# Patient Record
Sex: MALE | Race: BLACK OR AFRICAN AMERICAN | Employment: OTHER | URBAN - NONMETROPOLITAN AREA
[De-identification: names, ages, dates, MRNs, and addresses within clinical notes are randomized per-mention and may not be internally consistent; named-entity substitution may affect disease eponyms.]

---

## 2017-10-09 ENCOUNTER — HOSPITAL ENCOUNTER (OUTPATIENT)
Dept: INTERVENTIONAL RADIOLOGY/VASCULAR | Age: 22
Discharge: HOME | End: 2017-10-09

## 2017-10-09 DIAGNOSIS — Z13.6 ENCOUNTER FOR SCREENING FOR VASCULAR DISEASE: ICD-10-CM

## 2017-10-09 PROCEDURE — 9900000021 US VASCULAR SCREENING

## 2017-12-07 ENCOUNTER — HOSPITAL ENCOUNTER (INPATIENT)
Age: 22
LOS: 7 days | Discharge: HOME | DRG: 816 | End: 2017-12-14
Attending: INTERNAL MEDICINE | Admitting: INTERNAL MEDICINE
Payer: MEDICARE

## 2017-12-07 ENCOUNTER — APPOINTMENT (OUTPATIENT)
Dept: ULTRASOUND IMAGING | Age: 22
DRG: 816 | End: 2017-12-07
Payer: MEDICARE

## 2017-12-07 ENCOUNTER — APPOINTMENT (OUTPATIENT)
Dept: GENERAL RADIOLOGY | Age: 22
DRG: 816 | End: 2017-12-07
Payer: MEDICARE

## 2017-12-07 ENCOUNTER — APPOINTMENT (OUTPATIENT)
Dept: MRI IMAGING | Age: 22
DRG: 816 | End: 2017-12-07
Payer: MEDICARE

## 2017-12-07 DIAGNOSIS — I16.0 HYPERTENSIVE URGENCY: ICD-10-CM

## 2017-12-07 DIAGNOSIS — D75.1 POLYCYTHEMIA: ICD-10-CM

## 2017-12-07 DIAGNOSIS — R07.9 CHEST PAIN, UNSPECIFIED TYPE: Primary | ICD-10-CM

## 2017-12-07 DIAGNOSIS — R06.02 SOB (SHORTNESS OF BREATH): ICD-10-CM

## 2017-12-07 PROBLEM — I16.9 HYPERTENSIVE CRISIS: Status: ACTIVE | Noted: 2017-12-07

## 2017-12-07 PROBLEM — Q67.0 FACIAL ASYMMETRY: Status: ACTIVE | Noted: 2017-12-07

## 2017-12-07 LAB
ALBUMIN SERPL-MCNC: 5 G/DL (ref 3.5–5.1)
ALLEN TEST: POSITIVE
ALP BLD-CCNC: 62 U/L (ref 38–126)
ALT SERPL-CCNC: 37 U/L (ref 11–66)
ANION GAP SERPL CALCULATED.3IONS-SCNC: 16 MEQ/L (ref 8–16)
AST SERPL-CCNC: 30 U/L (ref 5–40)
BACTERIA: ABNORMAL
BASE EXCESS (CALCULATED): 1.4 MMOL/L (ref -2.5–2.5)
BASOPHILS # BLD: 0.4 %
BASOPHILS ABSOLUTE: 0 THOU/MM3 (ref 0–0.1)
BILIRUB SERPL-MCNC: 0.7 MG/DL (ref 0.3–1.2)
BILIRUBIN DIRECT: < 0.2 MG/DL (ref 0–0.3)
BILIRUBIN URINE: NEGATIVE
BLOOD, URINE: NEGATIVE
BUN BLDV-MCNC: 19 MG/DL (ref 7–22)
CALCIUM SERPL-MCNC: 9.7 MG/DL (ref 8.5–10.5)
CARBON MONOXIDE, BLOOD: 4.5 % CO SAT
CASTS: ABNORMAL /LPF
CASTS: ABNORMAL /LPF
CHARACTER, URINE: CLEAR
CHLORIDE BLD-SCNC: 102 MEQ/L (ref 98–111)
CO2: 25 MEQ/L (ref 23–33)
COLLECTED BY:: NORMAL
COLOR: YELLOW
CREAT SERPL-MCNC: 1 MG/DL (ref 0.4–1.2)
CRYSTALS: ABNORMAL
D-DIMER QUANTITATIVE: < 215 NG/ML FEU (ref 0–500)
DEVICE: NORMAL
EKG ATRIAL RATE: 107 BPM
EKG P AXIS: 48 DEGREES
EKG P-R INTERVAL: 154 MS
EKG Q-T INTERVAL: 324 MS
EKG QRS DURATION: 88 MS
EKG QTC CALCULATION (BAZETT): 432 MS
EKG R AXIS: 86 DEGREES
EKG T AXIS: 34 DEGREES
EKG VENTRICULAR RATE: 107 BPM
EOSINOPHIL # BLD: 4.1 %
EOSINOPHILS ABSOLUTE: 0.4 THOU/MM3 (ref 0–0.4)
EPITHELIAL CELLS, UA: ABNORMAL /HPF
GFR SERPL CREATININE-BSD FRML MDRD: > 90 ML/MIN/1.73M2
GLUCOSE BLD-MCNC: 134 MG/DL (ref 70–108)
GLUCOSE, URINE: NEGATIVE MG/DL
HCO3: 25 MMOL/L (ref 23–28)
HCT VFR BLD CALC: 54.3 % (ref 42–52)
HEMOGLOBIN: 18.7 GM/DL (ref 14–18)
KETONES, URINE: ABNORMAL
LEUKOCYTE ESTERASE, URINE: NEGATIVE
LIPASE: 21.8 U/L (ref 5.6–51.3)
LYMPHOCYTES # BLD: 16.7 %
LYMPHOCYTES ABSOLUTE: 1.5 THOU/MM3 (ref 1–4.8)
MCH RBC QN AUTO: 28.1 PG (ref 27–31)
MCHC RBC AUTO-ENTMCNC: 34.3 GM/DL (ref 33–37)
MCV RBC AUTO: 81.9 FL (ref 80–94)
MISCELLANEOUS LAB TEST RESULT: ABNORMAL
MONOCYTES # BLD: 4.5 %
MONOCYTES ABSOLUTE: 0.4 THOU/MM3 (ref 0.4–1.3)
NITRITE, URINE: NEGATIVE
NUCLEATED RED BLOOD CELLS: 0 /100 WBC
O2 SATURATION: 95 %
OSMOLALITY CALCULATION: 289.2 MOSMOL/KG (ref 275–300)
PCO2: 37 MMHG (ref 35–45)
PDW BLD-RTO: 13.5 % (ref 11.5–14.5)
PH BLOOD GAS: 7.44 (ref 7.35–7.45)
PH UA: 7
PLATELET # BLD: 248 THOU/MM3 (ref 130–400)
PMV BLD AUTO: 8.6 MCM (ref 7.4–10.4)
PO2: 74 MMHG (ref 71–104)
POTASSIUM SERPL-SCNC: 3.7 MEQ/L (ref 3.5–5.2)
PROTEIN UA: 30 MG/DL
RBC # BLD: 6.63 MILL/MM3 (ref 4.7–6.1)
RBC URINE: ABNORMAL /HPF
RENAL EPITHELIAL, UA: ABNORMAL
SEG NEUTROPHILS: 74.3 %
SEGMENTED NEUTROPHILS ABSOLUTE COUNT: 6.5 THOU/MM3 (ref 1.8–7.7)
SODIUM BLD-SCNC: 143 MEQ/L (ref 135–145)
SOURCE, BLOOD GAS: NORMAL
SPECIFIC GRAVITY UA: > 1.03 (ref 1–1.03)
TOTAL PROTEIN: 7.8 G/DL (ref 6.1–8)
TROPONIN T: < 0.01 NG/ML
TSH SERPL DL<=0.05 MIU/L-ACNC: 2.54 UIU/ML (ref 0.4–4.2)
UROBILINOGEN, URINE: 1 EU/DL
WBC # BLD: 8.8 THOU/MM3 (ref 4.8–10.8)
WBC UA: ABNORMAL /HPF
YEAST: ABNORMAL

## 2017-12-07 PROCEDURE — 2500000003 HC RX 250 WO HCPCS: Performed by: NURSE PRACTITIONER

## 2017-12-07 PROCEDURE — 1200000003 HC TELEMETRY R&B

## 2017-12-07 PROCEDURE — 82248 BILIRUBIN DIRECT: CPT

## 2017-12-07 PROCEDURE — 80053 COMPREHEN METABOLIC PANEL: CPT

## 2017-12-07 PROCEDURE — 36415 COLL VENOUS BLD VENIPUNCTURE: CPT

## 2017-12-07 PROCEDURE — 6370000000 HC RX 637 (ALT 250 FOR IP): Performed by: INTERNAL MEDICINE

## 2017-12-07 PROCEDURE — 85025 COMPLETE CBC W/AUTO DIFF WBC: CPT

## 2017-12-07 PROCEDURE — 81001 URINALYSIS AUTO W/SCOPE: CPT

## 2017-12-07 PROCEDURE — 70553 MRI BRAIN STEM W/O & W/DYE: CPT

## 2017-12-07 PROCEDURE — 93005 ELECTROCARDIOGRAM TRACING: CPT

## 2017-12-07 PROCEDURE — 36600 WITHDRAWAL OF ARTERIAL BLOOD: CPT

## 2017-12-07 PROCEDURE — 82803 BLOOD GASES ANY COMBINATION: CPT

## 2017-12-07 PROCEDURE — 99285 EMERGENCY DEPT VISIT HI MDM: CPT

## 2017-12-07 PROCEDURE — 84484 ASSAY OF TROPONIN QUANT: CPT

## 2017-12-07 PROCEDURE — A9579 GAD-BASE MR CONTRAST NOS,1ML: HCPCS | Performed by: INTERNAL MEDICINE

## 2017-12-07 PROCEDURE — 84443 ASSAY THYROID STIM HORMONE: CPT

## 2017-12-07 PROCEDURE — 71020 XR CHEST STANDARD TWO VW: CPT

## 2017-12-07 PROCEDURE — 2500000003 HC RX 250 WO HCPCS: Performed by: INTERNAL MEDICINE

## 2017-12-07 PROCEDURE — 96374 THER/PROPH/DIAG INJ IV PUSH: CPT

## 2017-12-07 PROCEDURE — 6360000004 HC RX CONTRAST MEDICATION: Performed by: INTERNAL MEDICINE

## 2017-12-07 PROCEDURE — 6360000002 HC RX W HCPCS: Performed by: INTERNAL MEDICINE

## 2017-12-07 PROCEDURE — 2580000003 HC RX 258: Performed by: INTERNAL MEDICINE

## 2017-12-07 PROCEDURE — 82375 ASSAY CARBOXYHB QUANT: CPT

## 2017-12-07 PROCEDURE — 76770 US EXAM ABDO BACK WALL COMP: CPT

## 2017-12-07 PROCEDURE — 83690 ASSAY OF LIPASE: CPT

## 2017-12-07 PROCEDURE — 6370000000 HC RX 637 (ALT 250 FOR IP): Performed by: NURSE PRACTITIONER

## 2017-12-07 PROCEDURE — 85379 FIBRIN DEGRADATION QUANT: CPT

## 2017-12-07 RX ORDER — PROPRANOLOL HYDROCHLORIDE 20 MG/1
TABLET ORAL 3 TIMES DAILY
Status: ON HOLD | COMMUNITY
End: 2017-12-14 | Stop reason: HOSPADM

## 2017-12-07 RX ORDER — ACETAMINOPHEN 160 MG
1 TABLET,DISINTEGRATING ORAL
COMMUNITY

## 2017-12-07 RX ORDER — ONDANSETRON 2 MG/ML
4 INJECTION INTRAMUSCULAR; INTRAVENOUS EVERY 6 HOURS PRN
Status: DISCONTINUED | OUTPATIENT
Start: 2017-12-07 | End: 2017-12-14 | Stop reason: HOSPADM

## 2017-12-07 RX ORDER — SODIUM CHLORIDE 0.9 % (FLUSH) 0.9 %
10 SYRINGE (ML) INJECTION PRN
Status: DISCONTINUED | OUTPATIENT
Start: 2017-12-07 | End: 2017-12-14 | Stop reason: HOSPADM

## 2017-12-07 RX ORDER — ACETAMINOPHEN 325 MG/1
650 TABLET ORAL EVERY 4 HOURS PRN
Status: DISCONTINUED | OUTPATIENT
Start: 2017-12-07 | End: 2017-12-14 | Stop reason: HOSPADM

## 2017-12-07 RX ORDER — SODIUM CHLORIDE 0.9 % (FLUSH) 0.9 %
10 SYRINGE (ML) INJECTION EVERY 12 HOURS SCHEDULED
Status: DISCONTINUED | OUTPATIENT
Start: 2017-12-07 | End: 2017-12-14 | Stop reason: HOSPADM

## 2017-12-07 RX ORDER — LABETALOL HYDROCHLORIDE 5 MG/ML
10 INJECTION, SOLUTION INTRAVENOUS ONCE
Status: COMPLETED | OUTPATIENT
Start: 2017-12-07 | End: 2017-12-07

## 2017-12-07 RX ORDER — ASPIRIN 81 MG/1
324 TABLET, CHEWABLE ORAL ONCE
Status: COMPLETED | OUTPATIENT
Start: 2017-12-07 | End: 2017-12-07

## 2017-12-07 RX ORDER — LABETALOL HYDROCHLORIDE 5 MG/ML
20 INJECTION, SOLUTION INTRAVENOUS EVERY 4 HOURS PRN
Status: DISCONTINUED | OUTPATIENT
Start: 2017-12-07 | End: 2017-12-14 | Stop reason: HOSPADM

## 2017-12-07 RX ORDER — M-VIT,TX,IRON,MINS/CALC/FOLIC 27MG-0.4MG
1 TABLET ORAL 2 TIMES DAILY
COMMUNITY

## 2017-12-07 RX ADMIN — ACETAMINOPHEN 650 MG: 325 TABLET ORAL at 21:51

## 2017-12-07 RX ADMIN — NITROGLYCERIN 0.5 INCH: 20 OINTMENT TOPICAL at 15:10

## 2017-12-07 RX ADMIN — GADOTERIDOL 20 ML: 279.3 INJECTION, SOLUTION INTRAVENOUS at 18:28

## 2017-12-07 RX ADMIN — ASPIRIN 81 MG 324 MG: 81 TABLET ORAL at 14:52

## 2017-12-07 RX ADMIN — LABETALOL HYDROCHLORIDE 20 MG: 5 INJECTION INTRAVENOUS at 22:04

## 2017-12-07 RX ADMIN — Medication 10 ML: at 21:47

## 2017-12-07 RX ADMIN — ENOXAPARIN SODIUM 40 MG: 40 INJECTION SUBCUTANEOUS at 21:47

## 2017-12-07 RX ADMIN — LABETALOL HYDROCHLORIDE 10 MG: 5 INJECTION INTRAVENOUS at 14:51

## 2017-12-07 ASSESSMENT — ENCOUNTER SYMPTOMS
ABDOMINAL PAIN: 0
SHORTNESS OF BREATH: 1
BLOOD IN STOOL: 0
PHOTOPHOBIA: 0
NAUSEA: 0
COLOR CHANGE: 0
EYE REDNESS: 0
SORE THROAT: 0
DIARRHEA: 0
BACK PAIN: 0
COUGH: 0
VOMITING: 0
SINUS PRESSURE: 0
CHEST TIGHTNESS: 0
VOICE CHANGE: 0
RHINORRHEA: 0
CONSTIPATION: 0
WHEEZING: 0
ABDOMINAL DISTENTION: 0

## 2017-12-07 ASSESSMENT — PAIN DESCRIPTION - PROGRESSION
CLINICAL_PROGRESSION: NOT CHANGED

## 2017-12-07 ASSESSMENT — PAIN SCALES - GENERAL
PAINLEVEL_OUTOF10: 4
PAINLEVEL_OUTOF10: 6
PAINLEVEL_OUTOF10: 6
PAINLEVEL_OUTOF10: 2
PAINLEVEL_OUTOF10: 2
PAINLEVEL_OUTOF10: 6
PAINLEVEL_OUTOF10: 6

## 2017-12-07 ASSESSMENT — PAIN DESCRIPTION - ONSET: ONSET: SUDDEN

## 2017-12-07 ASSESSMENT — PAIN DESCRIPTION - LOCATION: LOCATION: CHEST

## 2017-12-07 ASSESSMENT — PAIN DESCRIPTION - DESCRIPTORS: DESCRIPTORS: PRESSURE;STABBING

## 2017-12-07 ASSESSMENT — PAIN DESCRIPTION - ORIENTATION: ORIENTATION: LEFT

## 2017-12-07 ASSESSMENT — PAIN DESCRIPTION - PAIN TYPE: TYPE: ACUTE PAIN

## 2017-12-07 ASSESSMENT — PAIN DESCRIPTION - FREQUENCY: FREQUENCY: CONTINUOUS

## 2017-12-07 NOTE — ED PROVIDER NOTES
[facility] Emergency [address] [place] [address]       Chief Complaint   Patient presents with    Chest Pain    Shortness of Breath       Nurses Notes reviewed and I agree except as noted in the HPI. HISTORY OF PRESENT ILLNESS    Melissa Arnold is a 23 y.o. male who presents to the ED for evaluation of shortness of breath and chest pain onset yesterday. The patient states the pain started on his left side and it radiated to his right side yesterday, but stayed on his left side today. He rates his pain as a 6/10 in severity and describes it as a stabbing pain in character. He states that the pain is constant and is made worse when he moves. He reports associated heart palpitations, fever, diaphoresis, dizziness, headache earlier and joint pain in his legs which he takes Vitamin D for. The patient states he has not had this pain before. He has hypertension which he takes medication for. The patient denies a family history of heart disease. The patient does not work or go to school due to him being disabled. 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 Patient denies smoking, drinking or doing drugs. The patient denies taking any medication for his pain. Pain description:  Onset: yesterday   Location: left side of chest   Duration: constant   Character: stabbing  Aggravating factors: worse with movement  Radiation: left side to right side yesterday, but left side today  Severity: 6/10    Experienced previously: No    HPI was provided by the patient. REVIEW OF SYSTEMS     Review of Systems   Constitutional: Positive for diaphoresis and fever. Negative for appetite change, chills, fatigue and unexpected weight change. HENT: Negative for congestion, hearing loss, postnasal drip, rhinorrhea, sinus pressure, sore throat and voice change. Eyes: Negative for photophobia, redness and visual disturbance. Respiratory: Positive for shortness of breath.  Negative for cough, chest tightness and wheezing. Cardiovascular: Positive for chest pain and palpitations. Gastrointestinal: Negative for abdominal distention, abdominal pain, blood in stool, constipation, diarrhea, nausea and vomiting. Endocrine: Negative for cold intolerance, heat intolerance, polydipsia, polyphagia and polyuria. Genitourinary: Negative for decreased urine volume, difficulty urinating, dysuria, flank pain and frequency. Musculoskeletal: Positive for arthralgias (joint pain in legs). Negative for back pain, gait problem, joint swelling, neck pain and neck stiffness. Skin: Negative for color change and rash. Allergic/Immunologic: Negative for immunocompromised state. Neurological: Positive for dizziness and headaches (earlier). Negative for tremors, weakness, light-headedness and numbness. Hematological: Does not bruise/bleed easily. Psychiatric/Behavioral: Negative for behavioral problems, confusion, decreased concentration, hallucinations, self-injury and suicidal ideas. The patient is not nervous/anxious. PAST MEDICAL HISTORY    has a past medical history of Color blind and Hypertension. SURGICAL HISTORY      has no past surgical history on file. CURRENT MEDICATIONS       Previous Medications    CHOLECALCIFEROL (VITAMIN D3) 2000 UNITS CAPS    Take 1 capsule by mouth    PROPRANOLOL (INDERAL) 20 MG TABLET    Take by mouth 3 times daily       ALLERGIES     has No Known Allergies. FAMILY HISTORY     has no family status information on file. family history is not on file. SOCIAL HISTORY      reports that he has never smoked. He has never used smokeless tobacco. He reports that he does not drink alcohol or use drugs. PHYSICAL EXAM     INITIAL VITALS:  height is 6' (1.829 m) and weight is 200 lb (90.7 kg). His oral temperature is 98.4 °F (36.9 °C). His blood pressure is 174/102 (abnormal) and his pulse is 105. His respiration is 16 and oxygen saturation is 98%.     Physical Exam   Constitutional: He is oriented to person, place, and time. He appears well-developed and well-nourished. HENT:   Head: Normocephalic. Right Ear: External ear normal.   Left Ear: External ear normal.   Nose: Nose normal.   Mouth/Throat: Uvula is midline and oropharynx is clear and moist.   Eyes: Conjunctivae are normal. Pupils are equal, round, and reactive to light. Right eye exhibits nystagmus (horizontal). Left eye exhibits nystagmus (horizontal). Poor eye contact   Neck: Normal range of motion. Neck supple. Cardiovascular: Normal rate, regular rhythm, S1 normal, S2 normal, normal heart sounds and intact distal pulses. Pulmonary/Chest: Effort normal and breath sounds normal. No respiratory distress. He exhibits no tenderness. Abdominal: Soft. Normal appearance and bowel sounds are normal. He exhibits no distension. There is no tenderness. Musculoskeletal: Normal range of motion. Neurological: He is alert and oriented to person, place, and time. Skin: Skin is warm and dry. No rash noted. No erythema. No pallor. Psychiatric: His behavior is normal. Judgment and thought content normal.   Nursing note and vitals reviewed. DIFFERENTIAL DIAGNOSIS:   ACS, MI, PE    DIAGNOSTIC RESULTS     EKG: All EKG's are interpreted by the Emergency Department Physician who either signs or Co-signs this chart in the absence of a cardiologist.    EKG read and interpreted by myself with comparison to May, 27 2008 gives impression of normal sinus rhythm with heart rate of 107; interval 154; QRS 88;QTc 432; axis p-48,r-86,t-34. RADIOLOGY: non-plain film images(s) such as CT, Ultrasound and MRI are read by the radiologist.  Plain radiographic images are visualized and preliminarily interpreted by the emergency physician unless otherwise stated below. XR CHEST STANDARD (2 VW)   Final Result   Normal chest            **This report has been created using voice recognition software.   It may contain minor errors which are inherent in voice recognition technology. **      Final report electronically signed by Dr. Eugenio Lackey on 12/7/2017 3:21 PM      MRI BRAIN W WO CONTRAST    (Results Pending)         LABS:   Labs Reviewed   CBC WITH AUTO DIFFERENTIAL - Abnormal; Notable for the following:        Result Value    RBC 6.63 (*)     Hemoglobin 18.7 (*)     Hematocrit 54.3 (*)     All other components within normal limits   BASIC METABOLIC PANEL - Abnormal; Notable for the following:     Glucose 134 (*)     All other components within normal limits   URINALYSIS WITH MICROSCOPIC - Abnormal; Notable for the following:     Ketones, Urine TRACE (*)     Specific Gravity, UA >1.030 (*)     Protein, UA 30 (*)     All other components within normal limits   D-DIMER, QUANTITATIVE   HEPATIC FUNCTION PANEL   LIPASE   TROPONIN   TSH WITHOUT REFLEX   ANION GAP   GLOMERULAR FILTRATION RATE, ESTIMATED   OSMOLALITY   BLOOD GAS, ARTERIAL   CARBOXYHEMOGLOBIN       EMERGENCY DEPARTMENT COURSE:   Vitals:    Vitals:    12/07/17 1512 12/07/17 1555 12/07/17 1625 12/07/17 1740   BP: (!) 172/118 (!) 192/113 (!) 179/90 (!) 174/102   Pulse: 101 106 101 105   Resp: 14 14 16 16   Temp:       TempSrc:       SpO2: 98% 100% 98% 98%   Weight:       Height:             MDM    Patient was seen and evaluated in the emergency department, patient appeared to be in no acute distress. Physical exam reveals no specific findings. He had some horizontal nystagmus, but he has a gaze that apparently is chronically abnormal.  Labs are obtained, no abnormality was noted. He had of well score that made him a low probability of PE, but he was tachycardic therefore he could not be PERC rule cleared, D-dimer was not elevated, so patient low risk for PE. Troponin was negative and chest pains been present since yesterday. He did have some elevated hemoglobin, no previous labs were drawn this patient so unable to trend this. He is a nonsmoker, he does live at [facility].   Apparently the

## 2017-12-07 NOTE — ED NOTES
Patient transported to MRI via wheelchair in stable condition. MRI will contact transport to take patient to [facility] upon completion.       Starlin Collet, RN  12/07/17 1611

## 2017-12-07 NOTE — H&P
no known allergies. Social History:    reports that he has never smoked. He has never used smokeless tobacco. He reports that he does not drink alcohol or use drugs. Family History:   History reviewed. No pertinent family history. Review of systems:    10 point review of systems completed, all other than noted above are negative. Vitals:   Vitals:    12/07/17 1845   BP: 133/87   Pulse: 111   Resp: 14   Temp: 97.8 °F (36.6 °C)   SpO2:       BMI: Body mass index is 27.12 kg/m². Exam:  Physical Examination: General appearance - Well developed young male, met lying in bed, in no apparent distress. Mental status - alert, oriented to person, place, and time, his affect however appears inappropriate; patient does not maintain eye contact  Neck - supple, no significant adenopathy, no JVD, or carotid bruits  Chest - clear to auscultation, no wheezes, rales or rhonchi, symmetric air entry  Heart - Rapid rate but normal rhythm, loud S1  Abdomen - soft, nontender, nondistended, no masses or organomegaly  Neurological - alert, oriented, normal speech, no focal findings or movement disorder noted, abnormal findings: slight facial asymmetry, right brow higher than left with a crooked smile favoring the left, otherwise no focal muscle deficit. Musculoskeletal - no joint tenderness, deformity or swelling  Extremities - peripheral pulses normal, no pedal edema, no clubbing or cyanosis, brisk capillary refill.   Skin - normal coloration and turgor, no rashes, no suspicious skin lesions noted      Review of Labs and Diagnostic Testing:    Recent Results (from the past 24 hour(s))   EKG Chest Pain    Collection Time: 12/07/17  2:23 PM   Result Value Ref Range    Ventricular Rate 107 BPM    Atrial Rate 107 BPM    P-R Interval 154 ms    QRS Duration 88 ms    Q-T Interval 324 ms    QTc Calculation (Bazett) 432 ms    P Axis 48 degrees    R Axis 86 degrees    T Axis 34 degrees   D-dimer, quantitative Collection Time: 12/07/17  2:40 PM   Result Value Ref Range    D-Dimer, Quant < 215.00 0.00 - 500.0 ng/ml FEU   CBC auto differential    Collection Time: 12/07/17  2:40 PM   Result Value Ref Range    WBC 8.8 4.8 - 10.8 thou/mm3    RBC 6.63 (H) 4.70 - 6.10 mill/mm3    Hemoglobin 18.7 (H) 14.0 - 18.0 gm/dl    Hematocrit 54.3 (H) 42.0 - 52.0 %    MCV 81.9 80.0 - 94.0 fL    MCH 28.1 27.0 - 31.0 pg    MCHC 34.3 33.0 - 37.0 gm/dl    RDW 13.5 11.5 - 14.5 %    Platelets 710 837 - 851 thou/mm3    MPV 8.6 7.4 - 10.4 mcm    Seg Neutrophils 74.3 %    Lymphocytes 16.7 %    Monocytes 4.5 %    Eosinophils 4.1 %    Basophils 0.4 %    nRBC 0 /100 wbc    Segs Absolute 6.5 1.8 - 7.7 thou/mm3    Lymphocytes # 1.5 1.0 - 4.8 thou/mm3    Monocytes # 0.4 0.4 - 1.3 thou/mm3    Eosinophils # 0.4 0.0 - 0.4 thou/mm3    Basophils # 0.0 0.0 - 0.1 thou/mm3   Basic Metabolic Panel    Collection Time: 12/07/17  2:40 PM   Result Value Ref Range    Sodium 143 135 - 145 meq/L    Potassium 3.7 3.5 - 5.2 meq/L    Chloride 102 98 - 111 meq/L    CO2 25 23 - 33 meq/L    Glucose 134 (H) 70 - 108 mg/dL    BUN 19 7 - 22 mg/dL    CREATININE 1.0 0.4 - 1.2 mg/dL    Calcium 9.7 8.5 - 10.5 mg/dL   Hepatic function panel    Collection Time: 12/07/17  2:40 PM   Result Value Ref Range    Alb 5.0 3.5 - 5.1 g/dL    Total Bilirubin 0.7 0.3 - 1.2 mg/dL    Bilirubin, Direct <0.2 0.0 - 0.3 mg/dL    Alkaline Phosphatase 62 38 - 126 U/L    AST 30 5 - 40 U/L    ALT 37 11 - 66 U/L    Total Protein 7.8 6.1 - 8.0 g/dL   Lipase    Collection Time: 12/07/17  2:40 PM   Result Value Ref Range    Lipase 21.8 5.6 - 51.3 U/L   Troponin    Collection Time: 12/07/17  2:40 PM   Result Value Ref Range    Troponin T < 0.010 ng/ml   TSH without Reflex    Collection Time: 12/07/17  2:40 PM   Result Value Ref Range    TSH 2.540 0.400 - 4.20 uIU/mL   Anion Gap    Collection Time: 12/07/17  2:40 PM   Result Value Ref Range    Anion Gap 16.0 8.0 - 16.0 meq/L   Glomerular Filtration Rate,

## 2017-12-08 ENCOUNTER — APPOINTMENT (OUTPATIENT)
Dept: ULTRASOUND IMAGING | Age: 22
DRG: 816 | End: 2017-12-08
Payer: MEDICARE

## 2017-12-08 PROBLEM — I16.0 HYPERTENSIVE URGENCY: Status: ACTIVE | Noted: 2017-12-07

## 2017-12-08 LAB
ANION GAP SERPL CALCULATED.3IONS-SCNC: 19 MEQ/L (ref 8–16)
BASOPHILS # BLD: 0.4 %
BASOPHILS ABSOLUTE: 0 THOU/MM3 (ref 0–0.1)
BUN BLDV-MCNC: 18 MG/DL (ref 7–22)
CALCIUM SERPL-MCNC: 9.4 MG/DL (ref 8.5–10.5)
CHLORIDE BLD-SCNC: 100 MEQ/L (ref 98–111)
CO2: 24 MEQ/L (ref 23–33)
CREAT SERPL-MCNC: 0.8 MG/DL (ref 0.4–1.2)
EOSINOPHIL # BLD: 3.7 %
EOSINOPHILS ABSOLUTE: 0.3 THOU/MM3 (ref 0–0.4)
GFR SERPL CREATININE-BSD FRML MDRD: > 90 ML/MIN/1.73M2
GLUCOSE BLD-MCNC: 114 MG/DL (ref 70–108)
HCT VFR BLD CALC: 52.3 % (ref 42–52)
HEMOGLOBIN: 18 GM/DL (ref 14–18)
LV EF: 65 %
LVEF MODALITY: NORMAL
LYMPHOCYTES # BLD: 22.4 %
LYMPHOCYTES ABSOLUTE: 2.1 THOU/MM3 (ref 1–4.8)
MCH RBC QN AUTO: 28.6 PG (ref 27–31)
MCHC RBC AUTO-ENTMCNC: 34.4 GM/DL (ref 33–37)
MCV RBC AUTO: 83.1 FL (ref 80–94)
MONOCYTES # BLD: 7 %
MONOCYTES ABSOLUTE: 0.7 THOU/MM3 (ref 0.4–1.3)
NUCLEATED RED BLOOD CELLS: 0 /100 WBC
PDW BLD-RTO: 13.1 % (ref 11.5–14.5)
PLATELET # BLD: 217 THOU/MM3 (ref 130–400)
PMV BLD AUTO: 8.5 MCM (ref 7.4–10.4)
POTASSIUM SERPL-SCNC: 4 MEQ/L (ref 3.5–5.2)
RBC # BLD: 6.29 MILL/MM3 (ref 4.7–6.1)
RETICULOCYTE ABSOLUTE COUNT: 1.6 % (ref 0.5–2)
SEG NEUTROPHILS: 66.5 %
SEGMENTED NEUTROPHILS ABSOLUTE COUNT: 6.3 THOU/MM3 (ref 1.8–7.7)
SODIUM BLD-SCNC: 143 MEQ/L (ref 135–145)
WBC # BLD: 9.4 THOU/MM3 (ref 4.8–10.8)

## 2017-12-08 PROCEDURE — 99221 1ST HOSP IP/OBS SF/LOW 40: CPT | Performed by: INTERNAL MEDICINE

## 2017-12-08 PROCEDURE — 6370000000 HC RX 637 (ALT 250 FOR IP): Performed by: INTERNAL MEDICINE

## 2017-12-08 PROCEDURE — 36415 COLL VENOUS BLD VENIPUNCTURE: CPT

## 2017-12-08 PROCEDURE — 93975 VASCULAR STUDY: CPT

## 2017-12-08 PROCEDURE — 6360000002 HC RX W HCPCS: Performed by: INTERNAL MEDICINE

## 2017-12-08 PROCEDURE — 2580000003 HC RX 258: Performed by: INTERNAL MEDICINE

## 2017-12-08 PROCEDURE — 1200000003 HC TELEMETRY R&B

## 2017-12-08 PROCEDURE — 80048 BASIC METABOLIC PNL TOTAL CA: CPT

## 2017-12-08 PROCEDURE — 93306 TTE W/DOPPLER COMPLETE: CPT

## 2017-12-08 PROCEDURE — 85045 AUTOMATED RETICULOCYTE COUNT: CPT

## 2017-12-08 PROCEDURE — 85025 COMPLETE CBC W/AUTO DIFF WBC: CPT

## 2017-12-08 RX ORDER — PROPRANOLOL HYDROCHLORIDE 20 MG/1
20 TABLET ORAL 3 TIMES DAILY
Status: DISCONTINUED | OUTPATIENT
Start: 2017-12-08 | End: 2017-12-11

## 2017-12-08 RX ADMIN — Medication 10 ML: at 10:05

## 2017-12-08 RX ADMIN — Medication 10 ML: at 20:58

## 2017-12-08 RX ADMIN — ENOXAPARIN SODIUM 40 MG: 40 INJECTION SUBCUTANEOUS at 10:05

## 2017-12-08 RX ADMIN — PROPRANOLOL HYDROCHLORIDE 20 MG: 20 TABLET ORAL at 10:05

## 2017-12-08 RX ADMIN — LABETALOL HYDROCHLORIDE 20 MG: 5 INJECTION INTRAVENOUS at 12:22

## 2017-12-08 RX ADMIN — PROPRANOLOL HYDROCHLORIDE 20 MG: 20 TABLET ORAL at 15:56

## 2017-12-08 RX ADMIN — PROPRANOLOL HYDROCHLORIDE 20 MG: 20 TABLET ORAL at 20:58

## 2017-12-08 ASSESSMENT — PAIN SCALES - GENERAL: PAINLEVEL_OUTOF10: 0

## 2017-12-08 NOTE — CONSULTS
[facility]    [facility]  [address]  [facility], [address]  [address] [place] [address] [facility]      12/8/2017 7:05 AM         Pt Name:    Chel Ivy  MRN:     [id number]   [id number]  YOB: 1995  Admit Date:    12/7/2017  2:19 PM  Primary Care Physician:  Constance Mitchell MD   Room Number:  3V-11/513-S   Reason for Consult:  Uncontrolled blood pressure  Requesting Providor: Dr. Liss Dale  Primary Nephrologist: none    ### ISOLATION ###:   none     Admit Chief Complaint: left chest pain     History of Chief Complaint:   The patient is a 23y.o. year old white male who has never seen a nephrologist before. He mentioned that he ha shad hypertension since age 4 in 2004. He has been taking pills since then. He developed left chest pain that was non radiating 12/07/17. He has not undergone workup for his hypertension that he can remember. He has no trouble at bladder emptying. He does not use illegal drugs and does not check his blood pressure at home. Nephrology is following the patient for: uncontrolled hypertension     24 hour summary:   The patient was relaxing in bed. He feels improved and states that his left chest pain persists but is improved. Baseline eGFR > 90 ml/min   Admit eGFR 90 ml/min   Current eGFR 90 ml/min     Compliance with treatment plan: 100%     Allergies and Intolerances: ALLERGIES: Review of patient's allergies indicates no known allergies. MEDICATION INTOLERANCES: none  FOOD ALLERGIES: none  INSECT ALLERGIES: none  PLANT ALLERGIES: none     Past Medical History:  Past Medical History:   Diagnosis Date    Color blind     Cannot see red.  Hypertension         Past Surgical History:  History reviewed. No pertinent surgical history. Family History:  Family History   Problem Relation Age of Onset    Adopted:  Yes    High Blood Pressure Brother         Social History:  Social History     Social History    Marital status: Single none.    EYES  Blurred vision: none, Double vision: none, Photophobia: none, Eye pain: none, Eye discharge: none,  Eye redness: none. CARDIOVASCULAR  Chest pain: yes, Arm pain: none, Palpitations: none, Orthopnea: none, Claudication: none,   Leg swelling: none, PND: none. RESPIRATORY  Cough: none, Hemoptysis: none, Sputum production: none, Shortness of breath: none, Wheezing: none    GASTROINTESTINAL  Heartburn: none, Nausea: none, Vomiting: none, Abdominal pain: none, Diarrhea: none,   Constipation: none, Blood in the stool: none, Melena: none, Rebound: none, Rovsing's: none. GENITOURINARY  Dysuria: none, Pyuria: none, Gross hematuria: none, Urgency: none, Flank pain: none, STD: none. MUSCULOSKELETAL  Myalgia: none, Neck pain: none, Thoracic pain: none, Lumbar pain: none, Joint pain: none, Falls: none    ENDOCRINE/HEMATOLOGY/ALLERGIC  Easy bruising: none, Easy bleeding: none, Environmental allergies: none, Polydipsia: none. NEUROLOGICAL  Dizziness: none, Tingling: none, Numbness: none, Tremor: none, Sensory change: none,   Speech change: none, Focal weakness: none, Seizures: none, Loss of consciousness: none,    PSYCHIATRIC  Depression: none, Suicidal ideation: none, Heroin abuse: none, Cocaine abuse: none,   Marijuana abuse: none, Hallucinations: none, Anxiety: none, Memory loss: none       Physical Examination:  BP (!) 145/86   Pulse 76   Temp 97.4 °F (36.3 °C) (Oral)   Resp 16   Ht 6' (1.829 m)   Wt 242 lb 11.2 oz (110.1 kg)   SpO2 97%   BMI 32.92 kg/m²       General appearance: alert and cooperative with exam  HEENT: Head: Normocephalic, no lesions, without obvious abnormality.   Neck: no adenopathy, no carotid bruit, no JVD, supple, symmetrical, trachea midline and thyroid not enlarged, symmetric, no tenderness/mass/nodules  Lungs: clear to auscultation bilaterally  Heart: regular rate and rhythm, S1, S2 normal, no murmur, click, rub or gallop  Abdomen: soft, non-tender; bowel sounds

## 2017-12-08 NOTE — FLOWSHEET NOTE
12/07/17 2102   Provider Notification   Reason for Communication Evaluate   Provider Name Dr. Reji Garza   Provider Notification Physician   Method of Communication Call   Response See orders   Notification Time 2102     Dr. Reji Garza consulted regarding evaluation for accelerated hypertension, will add to list. New order for Renal US.

## 2017-12-08 NOTE — PLAN OF CARE
Problem: Pain:  Goal: Pain level will decrease  Pain level will decrease    Outcome: Ongoing  Patient denies pain this shift. Problem: Discharge Planning:  Goal: Participates in care planning  Participates in care planning   Outcome: Ongoing  Patient plans to be discharged home once medically stable     Problem: Cardiac Output - Decreased:  Goal: Hemodynamic stability will improve  Hemodynamic stability will improve   Outcome: Ongoing  Vitals:    12/08/17 0945   BP: (!) 158/91   Pulse: 91   Resp: 18   Temp: 98 °F (36.7 °C)   SpO2: 93%       Problem: Pain:  Goal: Pain level will decrease  Pain level will decrease    Outcome: Ongoing  Patient denies pain this shift. Comments: Care plan reviewed with patient. Patient verbalize understanding of the plan of care and contribute to goal setting.

## 2017-12-08 NOTE — CARE COORDINATION
12/8/17, 9:29 AM      [facility]       Admitted from: ED 12/7/2017/ Voldi 24 day: 1   Location: [facility]-A Reason for admit: Hypertensive crisis [I16.9] Status: IP  Admit order signed?: yes  PMH:  has a past medical history of Color blind and Hypertension. Procedure: Echo today, 12/8  Pertinent abnormal Imaging:none  Medications:none  Scheduled Meds:   propranolol  20 mg Oral TID    sodium chloride flush  10 mL Intravenous 2 times per day    enoxaparin  40 mg Subcutaneous Daily     Continuous Infusions:    Pertinent Info/Orders/Treatment Plan:  Telemetry, nephrology consult  Diet: DIET LOW SODIUM 2 GM;   DVT Prophylaxis: Lovenox  Smoking status:  reports that he has never smoked. He has never used smokeless tobacco.   Influenza Vaccination Screening Completed: yes  Pneumonia Vaccination Screening Completed: yes  Core measures: monitor, vte  PCP: Bruce Gray MD  Readmission:   no  Risk Score: 2.5     Discharge Planning  Current Residence:  Private Residence  Living Arrangements:  Alone   Support Systems:  Family Members, Friends/Neighbors, [employer]  Current Services PTA:     Potential Assistance Needed:  N/A  Potential Assistance Purchasing Medications:  No  Does patient want to participate in local refill/ meds to beds program?  No  Type of Home Care Services:  None  Patient expects to be discharged to:  Home  Expected Discharge date:  12/09/17  Follow Up Appointment: Best Day/ Time: Monday AM    Discharge Plan: Met with client, lives at home alone with good family support, no equipment used,no services received. Plans to return home,denies needs at discharge.      Evaluation: no

## 2017-12-09 LAB
BASOPHILS # BLD: 0.6 %
BASOPHILS ABSOLUTE: 0.1 THOU/MM3 (ref 0–0.1)
EOSINOPHIL # BLD: 3.2 %
EOSINOPHILS ABSOLUTE: 0.3 THOU/MM3 (ref 0–0.4)
HCT VFR BLD CALC: 54.7 % (ref 42–52)
HEMOGLOBIN: 19.1 GM/DL (ref 14–18)
LYMPHOCYTES # BLD: 21.5 %
LYMPHOCYTES ABSOLUTE: 2.2 THOU/MM3 (ref 1–4.8)
MCH RBC QN AUTO: 29.1 PG (ref 27–31)
MCHC RBC AUTO-ENTMCNC: 34.9 GM/DL (ref 33–37)
MCV RBC AUTO: 83.3 FL (ref 80–94)
MONOCYTES # BLD: 8 %
MONOCYTES ABSOLUTE: 0.8 THOU/MM3 (ref 0.4–1.3)
NUCLEATED RED BLOOD CELLS: 0 /100 WBC
PDW BLD-RTO: 12.9 % (ref 11.5–14.5)
PLATELET # BLD: 218 THOU/MM3 (ref 130–400)
PMV BLD AUTO: 8.5 MCM (ref 7.4–10.4)
RBC # BLD: 6.57 MILL/MM3 (ref 4.7–6.1)
SEG NEUTROPHILS: 66.7 %
SEGMENTED NEUTROPHILS ABSOLUTE COUNT: 6.9 THOU/MM3 (ref 1.8–7.7)
WBC # BLD: 10.4 THOU/MM3 (ref 4.8–10.8)

## 2017-12-09 PROCEDURE — 99232 SBSQ HOSP IP/OBS MODERATE 35: CPT | Performed by: INTERNAL MEDICINE

## 2017-12-09 PROCEDURE — 82088 ASSAY OF ALDOSTERONE: CPT

## 2017-12-09 PROCEDURE — 6370000000 HC RX 637 (ALT 250 FOR IP): Performed by: INTERNAL MEDICINE

## 2017-12-09 PROCEDURE — 6370000000 HC RX 637 (ALT 250 FOR IP): Performed by: NURSE PRACTITIONER

## 2017-12-09 PROCEDURE — 36415 COLL VENOUS BLD VENIPUNCTURE: CPT

## 2017-12-09 PROCEDURE — 84244 ASSAY OF RENIN: CPT

## 2017-12-09 PROCEDURE — 6360000002 HC RX W HCPCS: Performed by: INTERNAL MEDICINE

## 2017-12-09 PROCEDURE — 99999 PR OFFICE/OUTPT VISIT,PROCEDURE ONLY: CPT | Performed by: NURSE PRACTITIONER

## 2017-12-09 PROCEDURE — 1200000003 HC TELEMETRY R&B

## 2017-12-09 PROCEDURE — 85025 COMPLETE CBC W/AUTO DIFF WBC: CPT

## 2017-12-09 PROCEDURE — 2580000003 HC RX 258: Performed by: INTERNAL MEDICINE

## 2017-12-09 RX ORDER — PROPRANOLOL HYDROCHLORIDE 40 MG/1
40 TABLET ORAL 3 TIMES DAILY
Status: CANCELLED | OUTPATIENT
Start: 2017-12-09

## 2017-12-09 RX ORDER — NIFEDIPINE 30 MG/1
30 TABLET, EXTENDED RELEASE ORAL DAILY
Status: DISCONTINUED | OUTPATIENT
Start: 2017-12-09 | End: 2017-12-09

## 2017-12-09 RX ORDER — NIFEDIPINE 30 MG/1
30 TABLET, EXTENDED RELEASE ORAL NIGHTLY
Status: DISCONTINUED | OUTPATIENT
Start: 2017-12-09 | End: 2017-12-10

## 2017-12-09 RX ADMIN — PROPRANOLOL HYDROCHLORIDE 20 MG: 20 TABLET ORAL at 21:44

## 2017-12-09 RX ADMIN — PROPRANOLOL HYDROCHLORIDE 20 MG: 20 TABLET ORAL at 09:43

## 2017-12-09 RX ADMIN — PROPRANOLOL HYDROCHLORIDE 20 MG: 20 TABLET ORAL at 14:20

## 2017-12-09 RX ADMIN — NIFEDIPINE 30 MG: 30 TABLET, FILM COATED, EXTENDED RELEASE ORAL at 21:44

## 2017-12-09 RX ADMIN — ENOXAPARIN SODIUM 40 MG: 40 INJECTION SUBCUTANEOUS at 09:44

## 2017-12-09 RX ADMIN — LABETALOL HYDROCHLORIDE 20 MG: 5 INJECTION INTRAVENOUS at 12:03

## 2017-12-09 RX ADMIN — Medication 10 ML: at 21:46

## 2017-12-09 ASSESSMENT — PAIN DESCRIPTION - LOCATION: LOCATION: CHEST

## 2017-12-09 ASSESSMENT — PAIN DESCRIPTION - PAIN TYPE: TYPE: ACUTE PAIN

## 2017-12-09 ASSESSMENT — PAIN SCALES - GENERAL: PAINLEVEL_OUTOF10: 2

## 2017-12-09 ASSESSMENT — PAIN DESCRIPTION - DESCRIPTORS: DESCRIPTORS: DISCOMFORT

## 2017-12-09 ASSESSMENT — PAIN DESCRIPTION - ORIENTATION: ORIENTATION: LEFT

## 2017-12-09 NOTE — PLAN OF CARE
Problem: Pain:  Goal: Pain level will decrease  Pain level will decrease    Outcome: Met This Shift  Patient pain goal: no pain. Patient denies pain this shift. Problem: Discharge Planning:  Goal: Participates in care planning  Participates in care planning   Outcome: Met This Shift  Patient participated in plan of care and contributed to goal setting. Goal: Discharged to appropriate level of care  Discharged to appropriate level of care   Outcome: Ongoing  Patient from home, plans to return home at unknown date. Problem: Pain:  Goal: Pain level will decrease  Pain level will decrease    Outcome: Met This Shift  Patient pain goal: no pain. Patient denies pain this shift. Problem: Tissue Perfusion - Cardiopulmonary, Altered:  Goal: Absence of angina  Absence of angina   Outcome: Met This Shift  Patient states he had a few episodes CP during day shift, denies any CP so far this shift.

## 2017-12-09 NOTE — FLOWSHEET NOTE
12/09/17 1009   Provider Notification   Reason for Communication Review case   Provider Name Dr Jacey Clark    Provider Notification Physician   Method of Communication Call   Response See orders   Notification Time 1010   Dr Jacey Clark will see patient for consult of polycythemia    Telephone Orders -   Removed 500cc of blood from patient (therapeutic phlebotomy)  CBC lab 12/10  ANGEL-2 lab 12/10  Erythropoietin lab 12/10    1030 - Call placed to outpatient Oncology and message left for therapeutic phlebotomy to be done. Waiting for response.

## 2017-12-09 NOTE — PROGRESS NOTES
and echogenicity of the kidneys bilaterally.                     ASSESMENT:      Active Problems:    Hypertensive urgency    Polycythemia      Facial asymmetry    Chest pain         PLAN:  Renal artery doppler did not show  LOUANN , polycythemia may be playing a role in his HTN , plan hematology consult.          DVT prophylaxis: [x] Lovenox                                 [] SCDs                                 [] SQ Heparin                                 [] Encourage ambulation, low risk for DVT, no chemical or mechanical prophylaxis necessary              [] Already on Anticoagulation                Anticipated Disposition upon discharge: [x] Home                                                                         [] Home with Home Health                                                                         [] [facility]                                                                         [] [address]          Electronically signed by Dipika Jordan MD on 12/9/2017 at 9:57 AM

## 2017-12-09 NOTE — PROGRESS NOTES
[facility]         Renal Inpatient Follow-Up note         12/9/2017 3:08 PM    Pt Name:   Megan Jade  YOB: 1995  Attending:   Carmine Armstrong MD    Chief Complaint : Megan Jade is a 23 y.o. male being followed by nephrology for uncontrolled HTN    Interval History :   Patient seen and examined by me. No distress  Pt states he has generalized body aching 5/10 and chest pressure. Has some anxiety component. Pt has very astute hearing due to visual limitation, states he has also been having  Increased pain in his anterior shins for the past year or so. Hematology seeing for polycythemia       Scheduled Medications :    propranolol  20 mg Oral TID    sodium chloride flush  10 mL Intravenous 2 times per day    enoxaparin  40 mg Subcutaneous Daily          Vitals :  BP (!) 159/98   Pulse 75   Temp 98.8 °F (37.1 °C) (Oral)   Resp 18   Ht 6' (1.829 m)   Wt 240 lb 14.4 oz (109.3 kg)   SpO2 93%   BMI 32.67 kg/m²     24HR INTAKE/OUTPUT:      Intake/Output Summary (Last 24 hours) at 12/09/17 1508  Last data filed at 12/08/17 2049   Gross per 24 hour   Intake              510 ml   Output                0 ml   Net              510 ml       Last 3 Weights  Wt Readings from Last 3 Encounters:   12/09/17 240 lb 14.4 oz (109.3 kg)           Physical Exam :  Constitutional: alert and cooperative with exam, appears comfortable, no distress  Oral: moist oral mucus membranes  Neck: No JVD  Lungs: Clear  Heart: regular rate and rhythm, S1, S2   Extremities: No LE edema, dressing to june feet. GI: soft, non-tender, no guarding  Skin: multiple scabs/wounds to scalp, finger tips.    Musculo: moves all extremities  Neuro: no deficits apparent  Psychiatric: Awake, alert, Oriented         Last 3 CBC   Recent Labs      12/07/17   1440  12/08/17   0527  12/09/17   0459   WBC  8.8  9.4  10.4   RBC  6.63*  6.29*  6.57*   HGB  18.7*  18.0  19.1*   HCT  54.3*  52.3*  54.7*   PLT  248 217  218     Last 3 CMP  Recent Labs      12/07/17   1440  12/08/17   0527   NA  143  143   K  3.7  4.0   CL  102  100   CO2  25  24   BUN  19  18   CREATININE  1.0  0.8   CALCIUM  9.7  9.4   LABALBU  5.0   --    BILITOT  0.7   --          US duplex abd pelvic retro 12/8/17  Narrative   PROCEDURE: US DUP ABD PEL RETRO SCROT COMP       CLINICAL INFORMATION: HYPERTENSION, uncontrolled blood pressure        COMPARISON: No prior study.       TECHNIQUE:  Grayscale, color flow and spectral waveform ultrasound images were obtained through the bilateral kidneys and bilateral renal arteries. Resistive indices and renal aortic ratios were measured.         FINDINGS: The kidneys appear normal in size and echogenicity. No renal masses are identified. No hydronephrosis is seen. The resistive indices appear within normal limits within the kidneys bilaterally.        The renal aortic ratios within the right kidney measure 2.6, 0.7, and 1.1 within the proximal, mid and distal right renal arteries respectively.       The renal aortic ratios within the proximal, mid and distal renal arteries measures 0.6, 1.1, and 1.3 respectively. These are within normal range.       The peak systolic velocities within the proximal, mid and distal right renal arteries measure 46 cm/s, 49 cm/s, and 78 cm/s respectively.       The proximal, mid and distal peak systolic velocities within the left renal artery measure 45 cm/s, 84 cm/s, and 95 cm/s respectively.       The peak systolic velocity within the aorta at the level of the renal arteries measures approximately 73 cm/s.       RIGHT KIDNEY - 11.6 x 6.5 x 5.0 cm   Resistive Index - superior: 0.55, mid: 0.49, inferior: 0.53   Cortical Thickness - 1.0 cm       LEFT KIDNEY - 12.2 x 5.2 x 5.4 cm   Resistive Index - superior: 0.49, mid: 0.50, inferior: 0.59    Cortical Thickness - 1.2 cm           Impression   1. No sonographic evidence of significant renal artery stenosis.    2. Normal resistive indices bilaterally. 3. Normal size and echogenicity of the kidneys bilaterally. Assessment    1. Uncontrolled hypertension, variable, /109 this am. 159/98 this afternoon after am medication.   - On Propanolol 20 mg TID, and Labetalol is being given 20 mg q 4 hours PRN. - Will add Nifedipine 30 mg daily at bedtime, start today. - Will monitor response. 2. Chest pressure thought to be due to uncontrolled hypertension, maybe component of increased RBC mass of polycythemia. 3. Polycythemia, suspected, hematology workup to include JAK2 erythropoetin levels and therapeutic phlebotomy of 500 mL on Monday. 4. Legally blind. Has issues with neighbors four dogs barking all day, and highly astute hearing, stress component to HTN and pain? Discussed with Dr. Sara Sutherland. Gila Hay, CNP  [facility].

## 2017-12-09 NOTE — PLAN OF CARE
Problem: Pain:  Goal: Control of acute pain  Control of acute pain   Outcome: Ongoing  Patient complaining of generalized muscular aches. Problem: Discharge Planning:  Goal: Participates in care planning  Participates in care planning   Outcome: Ongoing  Patient plans to be discharged home once medically stable. Therapeutic Phlebotomy to be completed on Monday 12/11 per oncology. Problem: Cardiac Output - Decreased:  Goal: Hemodynamic stability will improve  Hemodynamic stability will improve   Outcome: Ongoing  Vitals:    12/09/17 1537   BP: (!) 142/95   Pulse: 65   Resp: 17   Temp: 99 °F (37.2 °C)   SpO2: 95%   Procardia started nightly per nephrology      Comments: Care plan reviewed with patient. Patient verbalize understanding of the plan of care and contribute to goal setting.

## 2017-12-10 LAB
BASOPHILS # BLD: 0.1 %
BASOPHILS ABSOLUTE: 0 THOU/MM3 (ref 0–0.1)
EOSINOPHIL # BLD: 2.2 %
EOSINOPHILS ABSOLUTE: 0.2 THOU/MM3 (ref 0–0.4)
HCT VFR BLD CALC: 52.4 % (ref 42–52)
HEMOGLOBIN: 17.8 GM/DL (ref 14–18)
LYMPHOCYTES # BLD: 13.8 %
LYMPHOCYTES ABSOLUTE: 1.3 THOU/MM3 (ref 1–4.8)
MCH RBC QN AUTO: 28.2 PG (ref 27–31)
MCHC RBC AUTO-ENTMCNC: 33.9 GM/DL (ref 33–37)
MCV RBC AUTO: 83.1 FL (ref 80–94)
MONOCYTES # BLD: 5.9 %
MONOCYTES ABSOLUTE: 0.6 THOU/MM3 (ref 0.4–1.3)
NUCLEATED RED BLOOD CELLS: 0 /100 WBC
PDW BLD-RTO: 13.3 % (ref 11.5–14.5)
PLATELET # BLD: 247 THOU/MM3 (ref 130–400)
PMV BLD AUTO: 8.1 MCM (ref 7.4–10.4)
RBC # BLD: 6.31 MILL/MM3 (ref 4.7–6.1)
SEG NEUTROPHILS: 78 %
SEGMENTED NEUTROPHILS ABSOLUTE COUNT: 7.6 THOU/MM3 (ref 1.8–7.7)
WBC # BLD: 9.7 THOU/MM3 (ref 4.8–10.8)

## 2017-12-10 PROCEDURE — 36415 COLL VENOUS BLD VENIPUNCTURE: CPT

## 2017-12-10 PROCEDURE — 2580000003 HC RX 258: Performed by: INTERNAL MEDICINE

## 2017-12-10 PROCEDURE — 6360000002 HC RX W HCPCS: Performed by: INTERNAL MEDICINE

## 2017-12-10 PROCEDURE — 85025 COMPLETE CBC W/AUTO DIFF WBC: CPT

## 2017-12-10 PROCEDURE — 1200000003 HC TELEMETRY R&B

## 2017-12-10 PROCEDURE — 81270 JAK2 GENE: CPT

## 2017-12-10 PROCEDURE — 99232 SBSQ HOSP IP/OBS MODERATE 35: CPT | Performed by: INTERNAL MEDICINE

## 2017-12-10 PROCEDURE — 82668 ASSAY OF ERYTHROPOIETIN: CPT

## 2017-12-10 PROCEDURE — 6370000000 HC RX 637 (ALT 250 FOR IP): Performed by: INTERNAL MEDICINE

## 2017-12-10 RX ORDER — NIFEDIPINE 30 MG/1
30 TABLET, EXTENDED RELEASE ORAL 2 TIMES DAILY
Status: DISCONTINUED | OUTPATIENT
Start: 2017-12-10 | End: 2017-12-14 | Stop reason: HOSPADM

## 2017-12-10 RX ADMIN — Medication 10 ML: at 19:53

## 2017-12-10 RX ADMIN — PROPRANOLOL HYDROCHLORIDE 20 MG: 20 TABLET ORAL at 19:52

## 2017-12-10 RX ADMIN — Medication 10 ML: at 08:30

## 2017-12-10 RX ADMIN — ENOXAPARIN SODIUM 40 MG: 40 INJECTION SUBCUTANEOUS at 08:31

## 2017-12-10 RX ADMIN — PROPRANOLOL HYDROCHLORIDE 20 MG: 20 TABLET ORAL at 08:31

## 2017-12-10 RX ADMIN — PROPRANOLOL HYDROCHLORIDE 20 MG: 20 TABLET ORAL at 13:24

## 2017-12-10 RX ADMIN — NIFEDIPINE 30 MG: 30 TABLET, FILM COATED, EXTENDED RELEASE ORAL at 19:52

## 2017-12-10 ASSESSMENT — PAIN SCALES - GENERAL: PAINLEVEL_OUTOF10: 1

## 2017-12-10 ASSESSMENT — PAIN DESCRIPTION - PAIN TYPE: TYPE: ACUTE PAIN

## 2017-12-10 ASSESSMENT — PAIN DESCRIPTION - DESCRIPTORS: DESCRIPTORS: ACHING

## 2017-12-10 NOTE — PLAN OF CARE
Problem: Pain:  Goal: Pain level will decrease  Pain level will decrease    Outcome: Ongoing  Patient denies pain this shift. Will continue to monitor.     Problem: Discharge Planning:  Goal: Participates in care planning  Participates in care planning   Outcome: Ongoing  Patient plans to be discharged home once medically stable. Therapeutic Phlebotomy to be completed on Monday 12/11 per oncology.     Problem: Cardiac Output - Decreased:  Goal: Hemodynamic stability will improve  Hemodynamic stability will improve   Outcome: Ongoing  Vitals:    12/10/17 0815   BP: (!) 149/95   Pulse: 70   Resp: 16   Temp: 98 °F (36.7 °C)   SpO2: 97%     Procardia started nightly per nephrology      Problem: Pain:  Goal: Pain level will decrease  Pain level will decrease    Outcome: Ongoing  Patient denies pain this shift. Will continue to monitor.     Problem: Discharge Planning:  Goal: Participates in care planning  Participates in care planning   Outcome: Ongoing  Patient plans to be discharged home once medically stable. Therapeutic Phlebotomy to be completed on Monday 12/11 per oncology.     Problem: Cardiac Output - Decreased:  Goal: Hemodynamic stability will improve  Hemodynamic stability will improve   Outcome: Ongoing  Vitals:    12/10/17 0815   BP: (!) 149/95   Pulse: 70   Resp: 16   Temp: 98 °F (36.7 °C)   SpO2: 97%     Procardia started nightly per nephrology      Comments: Care plan reviewed with patient. Patient verbalize understanding of the plan of care and contribute to goal setting.

## 2017-12-10 NOTE — PROGRESS NOTES
[facility]  Progress Note Dr Opal Thomas covering For Dr Nazanin Tyson      Patient:  Ruddy Sims  YOB: 1995  Date of Service: 12/10/2017  MRN: [id number]   Acct:  [de-identified]   Primary Care Physician: Maranda Sharma MD    SUBJECTIVE: has no complaints    Home Medications:   No current facility-administered medications on file prior to encounter. No current outpatient prescriptions on file prior to encounter. Scheduled Meds:   NIFEdipine  30 mg Oral Nightly    propranolol  20 mg Oral TID    sodium chloride flush  10 mL Intravenous 2 times per day    enoxaparin  40 mg Subcutaneous Daily     Continuous Infusions:   PRN Meds:sodium chloride flush, acetaminophen, magnesium hydroxide, ondansetron, labetalol        Allergies:  Review of patient's allergies indicates no known allergies. OBJECTIVE:    Vitals:   Vitals:    12/10/17 0400   BP: (!) 146/80   Pulse: 70   Resp: 18   Temp: 97.6 °F (36.4 °C)   SpO2: 95%      BMI: Body mass index is 32.66 kg/m². PHYSICAL EXAMINATION:          General Appearance:  Well developed, obese young male, in no apparent distress. Skin:  Skin has good color, good turgor, no rashes, no acute lesions  Lungs: CTA bilaterally, no wheeze, rales or rhonchi, good air entry, good respiratory effort  Heart: Regular rate and rhythm, S1 and S2 only without murmur, gallop or rub. Abdomen:  Soft, non-tender, normal bowel sounds. No bruits, organomegaly or masses. Extremities: Extremities warm to touch, pink, with no edema. , brisk capillary refill. Neurologic: Awake, alert and oriented.  No focal deficits      Review of Labs and Diagnostic Testing:    Recent Results (from the past 24 hour(s))   CBC Auto Differential    Collection Time: 12/10/17  4:38 AM   Result Value Ref Range    WBC 9.7 4.8 - 10.8 thou/mm3    RBC 6.31 (H) 4.70 - 6.10 mill/mm3    Hemoglobin 17.8 14.0 - 18.0 gm/dl    Hematocrit 52.4 (H) 42.0 - 52.0 %    MCV 83.1 80.0 - 94.0 fL normal blood flow. The resistive index is normal. The urinary bladder appears normal. There are normal bilateral ureteral jets. 1.  Normal renal ultrasound. **This report has been created using voice recognition software. It may contain minor errors which are inherent in voice recognition technology. ** Final report electronically signed by Dr. Ronna Lamb on 12/8/2017 1:51 AM    Us Dup Abd Pel Retro Scrot Comp    Result Date: 12/8/2017  PROCEDURE: US DUP ABD PEL RETRO SCROT COMP CLINICAL INFORMATION: HYPERTENSION, uncontrolled blood pressure COMPARISON: No prior study. TECHNIQUE:  Grayscale, color flow and spectral waveform ultrasound images were obtained through the bilateral kidneys and bilateral renal arteries. Resistive indices and renal aortic ratios were measured. FINDINGS: The kidneys appear normal in size and echogenicity. No renal masses are identified. No hydronephrosis is seen. The resistive indices appear within normal limits within the kidneys bilaterally. The renal aortic ratios within the right kidney measure 2.6, 0.7, and 1.1 within the proximal, mid and distal right renal arteries respectively. The renal aortic ratios within the proximal, mid and distal renal arteries measures 0.6, 1.1, and 1.3 respectively. These are within normal range. The peak systolic velocities within the proximal, mid and distal right renal arteries measure 46 cm/s, 49 cm/s, and 78 cm/s respectively. The proximal, mid and distal peak systolic velocities within the left renal artery measure 45 cm/s, 84 cm/s, and 95 cm/s respectively. The peak systolic velocity within the aorta at the level of the renal arteries measures approximately 73 cm/s. RIGHT KIDNEY - 11.6 x 6.5 x 5.0 cm Resistive Index - superior: 0.55, mid: 0.49, inferior: 0.53 Cortical Thickness - 1.0 cm LEFT KIDNEY - 12.2 x 5.2 x 5.4 cm Resistive Index - superior: 0.49, mid: 0.50, inferior: 0.59 Cortical Thickness - 1.2 cm     1.  No sonographic evidence of stenosis. 2. Normal resistive indices bilaterally. 3. Normal size and echogenicity of the kidneys bilaterally.                     ASSESMENT:      Active Problems:    Hypertensive urgency    Polycythemia      Facial asymmetry    Chest pain         PLAN:  Renal artery doppler did not show  LOUANN , polycythemia may be playing a role in his HTN ,  hematology consult in place, Therapeutic Phlebotomy to be completed on Monday 12/11 per oncology.     Dr Marci Lyles resumes care in a.m    DVT prophylaxis: [x] Lovenox                                 [] SCDs                                 [] SQ Heparin                                 [] Encourage ambulation, low risk for DVT, no chemical or mechanical prophylaxis necessary              [] Already on Anticoagulation                Anticipated Disposition upon discharge: [x] Home                                                                         [] Home with Home Health                                                                         [] [facility]                                                                         [] [address]          Electronically signed by Zaina Jean MD on 12/10/2017 at 6:13 AM

## 2017-12-11 LAB
ALDOSTERONE: 10.5 NG/DL
AMPHETAMINE+METHAMPHETAMINE URINE SCREEN: NEGATIVE
BACTERIA: ABNORMAL
BARBITURATE QUANTITATIVE URINE: NEGATIVE
BENZODIAZEPINE QUANTITATIVE URINE: NEGATIVE
BILIRUBIN URINE: NEGATIVE
BLOOD, URINE: NEGATIVE
CANNABINOID QUANTITATIVE URINE: NEGATIVE
CARBON MONOXIDE, BLOOD: 7.4 % CO SAT
CASTS: ABNORMAL /LPF
CASTS: ABNORMAL /LPF
CHARACTER, URINE: CLEAR
COCAINE METABOLITE QUANTITATIVE URINE: NEGATIVE
COLOR: YELLOW
CRYSTALS: ABNORMAL
EPITHELIAL CELLS, UA: ABNORMAL /HPF
GLUCOSE, URINE: NEGATIVE MG/DL
HCT VFR BLD CALC: 51.5 % (ref 42–52)
HEMOGLOBIN: 18.2 GM/DL (ref 14–18)
IRON SATURATION: 35 % (ref 20–50)
IRON: 96 UG/DL (ref 65–195)
KETONES, URINE: NEGATIVE
LEUKOCYTE EST, POC: NEGATIVE
MCH RBC QN AUTO: 29.4 PG (ref 27–31)
MCHC RBC AUTO-ENTMCNC: 35.3 GM/DL (ref 33–37)
MCV RBC AUTO: 83.2 FL (ref 80–94)
MISCELLANEOUS LAB TEST RESULT: ABNORMAL
NITRITE, URINE: NEGATIVE
OPIATES, URINE: NEGATIVE
OXYCODONE: NEGATIVE
PDW BLD-RTO: 13.2 % (ref 11.5–14.5)
PH UA: 6
PHENCYCLIDINE QUANTITATIVE URINE: NEGATIVE
PLATELET # BLD: 203 THOU/MM3 (ref 130–400)
PMV BLD AUTO: 8.7 MCM (ref 7.4–10.4)
PROTEIN UA: ABNORMAL MG/DL
RBC # BLD: 6.19 MILL/MM3 (ref 4.7–6.1)
RBC URINE: ABNORMAL /HPF
RENAL EPITHELIAL, UA: ABNORMAL
RENIN ACTIVITY: 1.6 NG/ML/HR
SPECIFIC GRAVITY UA: 1.02 (ref 1–1.03)
TOTAL IRON BINDING CAPACITY: 272 UG/DL (ref 171–450)
UROBILINOGEN, URINE: 0.2 EU/DL
VITAMIN B-12: 523 PG/ML (ref 211–911)
WBC # BLD: 9.9 THOU/MM3 (ref 4.8–10.8)
WBC UA: ABNORMAL /HPF
YEAST: ABNORMAL

## 2017-12-11 PROCEDURE — 82607 VITAMIN B-12: CPT

## 2017-12-11 PROCEDURE — 81001 URINALYSIS AUTO W/SCOPE: CPT

## 2017-12-11 PROCEDURE — 99232 SBSQ HOSP IP/OBS MODERATE 35: CPT | Performed by: INTERNAL MEDICINE

## 2017-12-11 PROCEDURE — 99999 PR OFFICE/OUTPT VISIT,PROCEDURE ONLY: CPT | Performed by: NURSE PRACTITIONER

## 2017-12-11 PROCEDURE — 36415 COLL VENOUS BLD VENIPUNCTURE: CPT

## 2017-12-11 PROCEDURE — 2580000003 HC RX 258: Performed by: INTERNAL MEDICINE

## 2017-12-11 PROCEDURE — 83540 ASSAY OF IRON: CPT

## 2017-12-11 PROCEDURE — 83550 IRON BINDING TEST: CPT

## 2017-12-11 PROCEDURE — 6370000000 HC RX 637 (ALT 250 FOR IP): Performed by: INTERNAL MEDICINE

## 2017-12-11 PROCEDURE — 82375 ASSAY CARBOXYHB QUANT: CPT

## 2017-12-11 PROCEDURE — 1200000003 HC TELEMETRY R&B

## 2017-12-11 PROCEDURE — 6360000002 HC RX W HCPCS: Performed by: INTERNAL MEDICINE

## 2017-12-11 PROCEDURE — 80307 DRUG TEST PRSMV CHEM ANLYZR: CPT

## 2017-12-11 PROCEDURE — 85027 COMPLETE CBC AUTOMATED: CPT

## 2017-12-11 RX ORDER — CHLORTHALIDONE 25 MG/1
25 TABLET ORAL DAILY
Status: DISCONTINUED | OUTPATIENT
Start: 2017-12-11 | End: 2017-12-14 | Stop reason: HOSPADM

## 2017-12-11 RX ADMIN — Medication 10 ML: at 20:49

## 2017-12-11 RX ADMIN — ENOXAPARIN SODIUM 40 MG: 40 INJECTION SUBCUTANEOUS at 08:22

## 2017-12-11 RX ADMIN — ACETAMINOPHEN 650 MG: 325 TABLET ORAL at 08:23

## 2017-12-11 RX ADMIN — CHLORTHALIDONE 25 MG: 25 TABLET ORAL at 14:24

## 2017-12-11 RX ADMIN — ACETAMINOPHEN 650 MG: 325 TABLET ORAL at 16:00

## 2017-12-11 RX ADMIN — PROPRANOLOL HYDROCHLORIDE 20 MG: 20 TABLET ORAL at 08:22

## 2017-12-11 RX ADMIN — NIFEDIPINE 30 MG: 30 TABLET, FILM COATED, EXTENDED RELEASE ORAL at 08:22

## 2017-12-11 RX ADMIN — NIFEDIPINE 30 MG: 30 TABLET, FILM COATED, EXTENDED RELEASE ORAL at 20:47

## 2017-12-11 RX ADMIN — Medication 10 ML: at 08:22

## 2017-12-11 RX ADMIN — METOPROLOL TARTRATE 12.5 MG: 25 TABLET ORAL at 20:47

## 2017-12-11 ASSESSMENT — PAIN DESCRIPTION - DESCRIPTORS: DESCRIPTORS: ACHING

## 2017-12-11 ASSESSMENT — PAIN SCALES - GENERAL
PAINLEVEL_OUTOF10: 2
PAINLEVEL_OUTOF10: 5
PAINLEVEL_OUTOF10: 4
PAINLEVEL_OUTOF10: 2
PAINLEVEL_OUTOF10: 2

## 2017-12-11 ASSESSMENT — PAIN DESCRIPTION - LOCATION: LOCATION: LEG

## 2017-12-11 ASSESSMENT — PAIN DESCRIPTION - PAIN TYPE
TYPE: ACUTE PAIN
TYPE: ACUTE PAIN

## 2017-12-11 NOTE — CARE COORDINATION
12/11/17, 12:15 PM      Davonte Hernandez day: 4  Location: [facility]/014 Reason for admit: Hypertensive crisis [I16.9]   Treatment Plan of Care: RBC 6.19, Hgb 18.2. Last /89. Hem/onc following, planning additional work-up and therapeutic phlebectomy. Nephrology following, renal fx at baseline. Core Measures: vte  PCP: Maranda Sharma MD  Discharge Plan:  Return home alone with family support.

## 2017-12-11 NOTE — PLAN OF CARE
Problem: Pain:  Goal: Control of acute pain  Control of acute pain   Outcome: Ongoing  Patient complains of generalized aching rating 1/10. PRN Tylenol available. Patient denies the need for Tylenol at this time. Problem: Discharge Planning:  Goal: Participates in care planning  Participates in care planning   Outcome: Ongoing  Patient able to identify goals to achieve throughout the shift. Goal: Discharged to appropriate level of care  Discharged to appropriate level of care   Outcome: Ongoing  Patient is from home and plans to return home when medically stable. Problem: Cardiac Output - Decreased:  Goal: Hemodynamic stability will improve  Hemodynamic stability will improve   Outcome: Ongoing  Vitals:    12/10/17 1945   BP: (!) 177/96   Pulse: 70   Resp: 18   Temp: 97.7 °F (36.5 °C)   SpO2: 98%     PO blood pressure medications given, will recheck BP. PRN Labetalol available for systolic BP > 069. Problem: Tissue Perfusion - Cardiopulmonary, Altered:  Goal: Absence of angina  Absence of angina   Outcome: Ongoing  Patient has denied the presence of chest pain so far this shift. Will continue to assess for changes. Comments: Care plan reviewed with patient. Patient verbalize understanding of the plan of care and contribute to goal setting.

## 2017-12-11 NOTE — PROGRESS NOTES
[facility]         Renal Inpatient Follow-Up note         12/11/2017 1:24 PM    Pt Name:   Cara Boone  YOB: 1995  Attending:   Ludmila Lin MD    Chief Complaint : Cara Boone is a 23 y.o. male being followed by nephrology for uncontrolled HTN    Interval History :   Patient seen and examined by me. No distress  States his BP has been fluctuating.  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    Hematology seeing for polycythemia       Scheduled Medications :    chlorthalidone  25 mg Oral Daily    NIFEdipine  30 mg Oral BID    propranolol  20 mg Oral TID    sodium chloride flush  10 mL Intravenous 2 times per day    enoxaparin  40 mg Subcutaneous Daily          Vitals :  /89   Pulse 69   Temp 97.6 °F (36.4 °C) (Oral)   Resp 18   Ht 6' (1.829 m)   Wt 241 lb 3.2 oz (109.4 kg)   SpO2 94%   BMI 32.71 kg/m²     24HR INTAKE/OUTPUT:      Intake/Output Summary (Last 24 hours) at 12/11/17 1324  Last data filed at 12/10/17 1952   Gross per 24 hour   Intake              410 ml   Output                0 ml   Net              410 ml       Last 3 Weights  Wt Readings from Last 3 Encounters:   12/11/17 241 lb 3.2 oz (109.4 kg)           Physical Exam :  Constitutional: alert and cooperative with exam, appears comfortable, no distress  Oral: moist oral mucus membranes  Neck: No JVD  Lungs: Clear  Heart: regular rate and rhythm, S1, S2   Extremities: No LE edema  GI: soft, non-tender, no guarding  Skin: Warm dry intact  Musculo: moves all extremities  Neuro: no deficits apparent  Psychiatric: Awake, alert, Oriented         Last 3 CBC   Recent Labs      12/09/17   0459  12/10/17   0438  12/11/17   0528   WBC  10.4  9.7  9.9   RBC  6.57*  6.31*  6.19*   HGB  19.1*  17.8  18.2*   HCT  54.7*  52.4*  51.5   PLT  218  247  203     Last 3 CMP  No results for input(s): NA, K, CL, CO2, BUN, CREATININE, CALCIUM, LABALBU, BILITOT in the last 72 hours. Invalid input(s): GLU      US duplex abd pelvic retro 12/8/17  Narrative   PROCEDURE: US DUP ABD PEL RETRO SCROT COMP       CLINICAL INFORMATION: HYPERTENSION, uncontrolled blood pressure        COMPARISON: No prior study.       TECHNIQUE:  Grayscale, color flow and spectral waveform ultrasound images were obtained through the bilateral kidneys and bilateral renal arteries. Resistive indices and renal aortic ratios were measured.         FINDINGS: The kidneys appear normal in size and echogenicity. No renal masses are identified. No hydronephrosis is seen. The resistive indices appear within normal limits within the kidneys bilaterally.        The renal aortic ratios within the right kidney measure 2.6, 0.7, and 1.1 within the proximal, mid and distal right renal arteries respectively.       The renal aortic ratios within the proximal, mid and distal renal arteries measures 0.6, 1.1, and 1.3 respectively. These are within normal range.       The peak systolic velocities within the proximal, mid and distal right renal arteries measure 46 cm/s, 49 cm/s, and 78 cm/s respectively.       The proximal, mid and distal peak systolic velocities within the left renal artery measure 45 cm/s, 84 cm/s, and 95 cm/s respectively.       The peak systolic velocity within the aorta at the level of the renal arteries measures approximately 73 cm/s.       RIGHT KIDNEY - 11.6 x 6.5 x 5.0 cm   Resistive Index - superior: 0.55, mid: 0.49, inferior: 0.53   Cortical Thickness - 1.0 cm       LEFT KIDNEY - 12.2 x 5.2 x 5.4 cm   Resistive Index - superior: 0.49, mid: 0.50, inferior: 0.59    Cortical Thickness - 1.2 cm           Impression   1. No sonographic evidence of significant renal artery stenosis. 2. Normal resistive indices bilaterally. 3. Normal size and echogenicity of the kidneys bilaterally. Assessment    1.  Uncontrolled hypertension, variable control  - On Propanolol 20 mg TID, and Labetalol is being given 20 mg q 4 hours PRN. - Have added Nifedipine 30 mg and increased to BID   - Will discontinue Propanolol at this time and add instead Metoprolol 25 mg BID. - Also send urine toxicology screen. - Will monitor response. 2. Chest pressure thought to be due to uncontrolled hypertension, maybe component of increased RBC mass of polycythemia. 3. Polycythemia, hematology workup to include JAK2 erythropoetin levels and therapeutic phlebotomy of 500 mL  4. Legally blind. 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 Discussed with Dr. Yashira Arias, CNP  [facility].

## 2017-12-11 NOTE — PROGRESS NOTES
[facility]      Progress Note  [facility] Renal Telemetry 6K       Patient: Ra Ort  Unit/Bed: 0O-73/505-O  YOB: 1995  MRN: [id number]  Acct: [de-identified]   Admitting Diagnosis: Hypertensive crisis [I16.9]  Admit Date:  12/7/2017  Hospital Day: 4    Interval History:    Patient presented with symptoms of chest pain and headache in the setting of accelerated hypertension and polycythemia. Diagnostic findings from work up that includes MRI, EKG, Renal US and ECHO have been non-revealing. He is on admission for diagnostic evaluation and BP control and currently being treated with IV Labetalol. The following Services are consulting: Nephrology     Chart, Labs, Radiology studies, and Consults reviewed. Subjective:  Patient seen and examined   There are no new complaints today. Objective:   BP (!) 162/92   Pulse 75   Temp 97.8 °F (36.6 °C) (Oral)   Resp 18   Ht 6' (1.829 m)   Wt 241 lb 3.2 oz (109.4 kg)   SpO2 97%   BMI 32.71 kg/m²       Intake/Output Summary (Last 24 hours) at 12/11/17 1121  Last data filed at 12/10/17 1952   Gross per 24 hour   Intake              890 ml   Output                0 ml   Net              890 ml        General Appearance:  Well developed, obese young male, in no apparent distress. Skin:  Skin has good color, good turgor, no rashes, no acute lesions  Lungs: CTA bilaterally, no wheeze, rales or rhonchi, good air entry, good respiratory effort  Heart: Regular rate and rhythm, S1 and S2 only without murmur, gallop or rub. Abdomen:  Soft, non-tender, normal bowel sounds. No bruits, organomegaly or masses. Extremities: Extremities warm to touch, pink, with no edema. , brisk capillary refill. Neurologic: Awake, alert and oriented.  No focal deficits  Affect: Neutral/Euthymic(normal)    Maxwell:  Drains:  Central Line/port:   EKG:  Imaging:    Diet:  DIET LOW SODIUM 2 GM;    DVT Prophylaxis:    Data:  CBC:   Recent Labs      12/09/17   0459 12/10/17   0438  12/11/17   0528   WBC  10.4  9.7  9.9   RBC  6.57*  6.31*  6.19*   HGB  19.1*  17.8  18.2*   HCT  54.7*  52.4*  51.5   MCV  83.3  83.1  83.2   RDW  12.9  13.3  13.2   PLT  218  247  203     BMP: No results for input(s): NA, K, CL, CO2, PHOS, BUN, CREATININE in the last 72 hours. Invalid input(s): CA  BNP: No results for input(s): BNP in the last 72 hours. PT/INR: No results for input(s): PROTIME, INR in the last 72 hours. APTT: No results for input(s): APTT in the last 72 hours. CARDIAC ENZYMES: No results for input(s): CKMB, CKMBINDEX, TROPONINT in the last 72 hours. Invalid input(s): CKTOTAL;3      Assessment/Plan:  Active Problems:    Hypertensive urgency    Polycythemia due to fall in plasma volume    Facial asymmetry    Chest pain    Polycythemia      Active Issues  - Hypertension  - elevated hematocrit    PLAN  1. Nephrology evaluation and recommendations acknowledged with thanks  - Renal imaging reports reviewed  - will follow Aldosterone and Renin levels  2. Hematology evaluation and recommendations acknowledged with thanks  - scheduled for therapeutic phlebotomy today  - will recheck CO levels  3. BP control improved; will add Chlorthalidone and monitor  4. ECHO reviewed  5.  Continue supportive measures and other current management in the interim    Electronically signed by Ludmila Lin MD on 12/11/2017 at 11:21 AM    Attending Physician

## 2017-12-11 NOTE — CONSULTS
Extraocular  movements are intact. Throat is clear. NECK:  No nodes. No thyromegaly. LUNGS:  Clear to auscultation and percussion. HEART:  Regular rhythm. ABDOMEN:  Soft and nontender. No organomegaly. EXTREMITIES:  No edema. No calf tenderness. NEUROLOGIC:  Nonfocal.  LYMPHATIC SYSTEM:  No peripheral lymphadenopathy palpable. LABORATORY DATA:  His white count has been normal at 9.7, differential is  normal.  Hemoglobin today has come down to 17.8; however, hematocrit is  still elevated at 52.4, yesterday hemoglobin was 19.1 and hematocrit was  54.7. RBC count is also elevated today at 6.31. Reticulocyte count is  normal at 1.6 and D-dimers were less than 215 and creatinine is 0.8. Ultrasound of the kidneys shows no sonographic evidence of significant  renal artery stenosis. There is no cyst in the kidney. MRA of the brain  was essentially unremarkable. IMPRESSION:  1. Elevated hemoglobin and hematocrit without any hypoxemia, without any  smoking, asthma or COPD. More likely, he has polycythemia Vera. He was  not on any diuretics. Polycythemia may be contributing towards his  hypertension. 2.  Hypertension. 3.  Color blindness. PLAN:  I will check the JAK2 gene mutation and also will check  erythropoietin level and B12 and iron levels and get the phlebotomy done. His therapeutic phlebotomy is 500 mL of blood. They could not do it on the  weekend, so we will do it tomorrow and monitor the CBC going forward. Thank you, Dr. Megan Fuentes, for asking me to take part in the care of this  patient.         Chrissie Carlisle M.D.    D: 12/10/2017 19:59:53       T: 12/10/2017 21:13:46     [email or web address]  Job#: [id number]     Doc#: [id number]    CC:

## 2017-12-11 NOTE — CARE COORDINATION
12/11/17, 2:06 PM  [facility] day: 4  Discharge barrier: Therapeutic phlebotomy cannot be done with patient as an inpatient. He would have to be discharged and have the procedure done as outpatient. 1350: Call from Anthony Nice, director of Care Coordination regarding possible discharge on this patient today. 1355: Call placed to Dr. Yvette Parks. He is ok with discharge today in order for patient to have therapeutic phlebotomy if ok with Dr. Michaelle Leigh. 1405: text message sent to Dr. Michaelle Leigh with above information. He is \"Ok\" with discharge. 1405: text out to nephrology asking if they are ok with discharge. 1440: spoke with FRANCO Lewis with nephrology. They would like patient to stay one more day in the hospital d/t patient with (+) headache and BP medication changes. 1440: Spoke with Dr. Yvette Parks; informed him that nephrology wants to keep this patient until tomorrow d/t BP issues/medication adjustments  1442: Text sent to Dr. Danae Bullock him nephrology would like patient to stay d/t BP issues/medication adjustments. 1445: Jayshree Hahn RN (patient's primary RN) updated. 1455: Spoke with Georgia Daily in outpatient oncology ([id number]). She will keep an eye out for this patient to possibly be discharged tomorrow and come over for his phlebotomy.

## 2017-12-12 LAB
ANION GAP SERPL CALCULATED.3IONS-SCNC: 14 MEQ/L (ref 8–16)
BUN BLDV-MCNC: 11 MG/DL (ref 7–22)
CALCIUM SERPL-MCNC: 9.8 MG/DL (ref 8.5–10.5)
CARBON MONOXIDE, BLOOD: 5.1 % CO SAT
CHLORIDE BLD-SCNC: 103 MEQ/L (ref 98–111)
CO2: 24 MEQ/L (ref 23–33)
CREAT SERPL-MCNC: 0.9 MG/DL (ref 0.4–1.2)
ERYTHROPOIETIN: 4 MU/ML (ref 4–27)
GFR SERPL CREATININE-BSD FRML MDRD: > 90 ML/MIN/1.73M2
GLUCOSE BLD-MCNC: 113 MG/DL (ref 70–108)
POTASSIUM SERPL-SCNC: 4 MEQ/L (ref 3.5–5.2)
SODIUM BLD-SCNC: 141 MEQ/L (ref 135–145)
T4 FREE: 1.58 NG/DL (ref 0.93–1.76)
TROPONIN T: < 0.01 NG/ML

## 2017-12-12 PROCEDURE — 36415 COLL VENOUS BLD VENIPUNCTURE: CPT

## 2017-12-12 PROCEDURE — 83835 ASSAY OF METANEPHRINES: CPT

## 2017-12-12 PROCEDURE — 93005 ELECTROCARDIOGRAM TRACING: CPT

## 2017-12-12 PROCEDURE — 6370000000 HC RX 637 (ALT 250 FOR IP): Performed by: INTERNAL MEDICINE

## 2017-12-12 PROCEDURE — 84439 ASSAY OF FREE THYROXINE: CPT

## 2017-12-12 PROCEDURE — 99232 SBSQ HOSP IP/OBS MODERATE 35: CPT | Performed by: INTERNAL MEDICINE

## 2017-12-12 PROCEDURE — 82375 ASSAY CARBOXYHB QUANT: CPT

## 2017-12-12 PROCEDURE — 1200000003 HC TELEMETRY R&B

## 2017-12-12 PROCEDURE — 84484 ASSAY OF TROPONIN QUANT: CPT

## 2017-12-12 PROCEDURE — 82384 ASSAY THREE CATECHOLAMINES: CPT

## 2017-12-12 PROCEDURE — 6360000002 HC RX W HCPCS: Performed by: INTERNAL MEDICINE

## 2017-12-12 PROCEDURE — 80048 BASIC METABOLIC PNL TOTAL CA: CPT

## 2017-12-12 PROCEDURE — 2580000003 HC RX 258: Performed by: INTERNAL MEDICINE

## 2017-12-12 PROCEDURE — 99223 1ST HOSP IP/OBS HIGH 75: CPT | Performed by: INTERNAL MEDICINE

## 2017-12-12 RX ORDER — NITROGLYCERIN 0.4 MG/1
0.4 TABLET SUBLINGUAL EVERY 5 MIN PRN
Status: DISCONTINUED | OUTPATIENT
Start: 2017-12-12 | End: 2017-12-14 | Stop reason: HOSPADM

## 2017-12-12 RX ORDER — METOPROLOL TARTRATE 50 MG/1
50 TABLET, FILM COATED ORAL 2 TIMES DAILY
Status: DISCONTINUED | OUTPATIENT
Start: 2017-12-12 | End: 2017-12-14 | Stop reason: HOSPADM

## 2017-12-12 RX ADMIN — LABETALOL HYDROCHLORIDE 20 MG: 5 INJECTION INTRAVENOUS at 18:23

## 2017-12-12 RX ADMIN — Medication 10 ML: at 09:10

## 2017-12-12 RX ADMIN — NIFEDIPINE 30 MG: 30 TABLET, FILM COATED, EXTENDED RELEASE ORAL at 09:37

## 2017-12-12 RX ADMIN — ENOXAPARIN SODIUM 40 MG: 40 INJECTION SUBCUTANEOUS at 09:18

## 2017-12-12 RX ADMIN — METOPROLOL TARTRATE 50 MG: 50 TABLET, FILM COATED ORAL at 20:32

## 2017-12-12 RX ADMIN — CHLORTHALIDONE 25 MG: 25 TABLET ORAL at 13:21

## 2017-12-12 RX ADMIN — NIFEDIPINE 30 MG: 30 TABLET, FILM COATED, EXTENDED RELEASE ORAL at 20:32

## 2017-12-12 RX ADMIN — LABETALOL HYDROCHLORIDE 20 MG: 5 INJECTION INTRAVENOUS at 09:05

## 2017-12-12 RX ADMIN — ACETAMINOPHEN 650 MG: 325 TABLET ORAL at 09:36

## 2017-12-12 RX ADMIN — ACETAMINOPHEN 650 MG: 325 TABLET ORAL at 20:36

## 2017-12-12 RX ADMIN — NITROGLYCERIN 0.4 MG: 0.4 TABLET SUBLINGUAL at 09:25

## 2017-12-12 RX ADMIN — METOPROLOL TARTRATE 12.5 MG: 25 TABLET ORAL at 09:37

## 2017-12-12 RX ADMIN — Medication 10 ML: at 20:32

## 2017-12-12 ASSESSMENT — PAIN DESCRIPTION - ORIENTATION
ORIENTATION: RIGHT;LEFT;ANTERIOR;MID
ORIENTATION: RIGHT;LEFT;MID

## 2017-12-12 ASSESSMENT — PAIN SCALES - GENERAL
PAINLEVEL_OUTOF10: 5
PAINLEVEL_OUTOF10: 3
PAINLEVEL_OUTOF10: 6

## 2017-12-12 ASSESSMENT — PAIN DESCRIPTION - PAIN TYPE
TYPE: ACUTE PAIN
TYPE: ACUTE PAIN

## 2017-12-12 ASSESSMENT — PAIN DESCRIPTION - LOCATION
LOCATION: CHEST
LOCATION: CHEST

## 2017-12-12 ASSESSMENT — PAIN DESCRIPTION - FREQUENCY: FREQUENCY: CONTINUOUS

## 2017-12-12 ASSESSMENT — PAIN DESCRIPTION - DESCRIPTORS: DESCRIPTORS: PRESSURE

## 2017-12-12 NOTE — FLOWSHEET NOTE
12/12/17 0913   Provider Notification   Reason for Communication Review case   Provider Name Dr Rekha Yarbrough   Provider Notification Physician   Method of Communication Call   Response Waiting for response   Notification Time 0900   0900 One to one call placed to Dr Rekha Yarbrough patient complaining of chest pain 6 out of 10 dizzy, diaphoretic, /90 , patient states feeling SOB spO2 94% waiting for response    0910 Labatolol pushed, Lovenox administered. EKG ordered. /79 . Waiting for response.      6299 telephone response - SL nitro 0.4mg PRN q5 min x 3  /91  93%,

## 2017-12-12 NOTE — PROGRESS NOTES
[facility]      Progress Note  STRZ Renal Telemetry 6K       Patient: Ruddy Coma  Unit/Bed: 0G-56/216-O  YOB: 1995  MRN: [id number]  Acct: [de-identified]   Admitting Diagnosis: Hypertensive crisis [I16.9]  Admit Date:  12/7/2017  Hospital Day: 5    Interval History:    Patient presented with symptoms of chest pain and headache in the setting of accelerated hypertension and polycythemia. Diagnostic findings from work up that includes MRI, EKG, Renal US and ECHO have been non-revealing. He is on admission for diagnostic evaluation and BP control and currently being treated with IV Labetalol. The following Services are consulting: Nephrology     Chart, Labs, Radiology studies, and Consults reviewed. Subjective:  Patient seen and examined   He complains of chest pain today. Objective:   BP (!) 151/92   Pulse 92   Temp 98.8 °F (37.1 °C) (Oral)   Resp 20   Ht 6' (1.829 m)   Wt 241 lb 6.5 oz (109.5 kg)   SpO2 92%   BMI 32.74 kg/m²       Intake/Output Summary (Last 24 hours) at 12/12/17 1442  Last data filed at 12/12/17 1123   Gross per 24 hour   Intake              560 ml   Output              400 ml   Net              160 ml        General Appearance:  Well developed, obese young male, in no apparent distress. Skin:  Skin has good color, good turgor, no rashes, no acute lesions  Lungs: CTA bilaterally, no wheeze, rales or rhonchi, good air entry, good respiratory effort  Heart: Regular rate and rhythm, S1 and S2 only without murmur, gallop or rub. Abdomen:  Soft, non-tender, normal bowel sounds. No bruits, organomegaly or masses. Extremities: Extremities warm to touch, pink, with no edema. , brisk capillary refill. Neurologic: Awake, alert and oriented.  No focal deficits  Affect: Neutral/Euthymic(normal)    Maxwell:  Drains:  Central Line/port:   EKG:  Imaging:    Diet:  DIET LOW SODIUM 2 GM;    DVT Prophylaxis:    Data:  CBC:   Recent Labs      12/10/17   [id number]

## 2017-12-12 NOTE — PLAN OF CARE
Problem: Pain:  Goal: Control of acute pain  Control of acute pain   Outcome: Ongoing  Patient complains of generalized body aches rating 2/10, PRN Tylenol available. Problem: Discharge Planning:  Goal: Participates in care planning  Participates in care planning   Outcome: Ongoing  Patient able to identify goals to achieve throughout the shift. Goal: Discharged to appropriate level of care  Discharged to appropriate level of care   Outcome: Ongoing  Patient plans to return home at discharge. Problem: Cardiac Output - Decreased:  Goal: Hemodynamic stability will improve  Hemodynamic stability will improve   Outcome: Ongoing  Vitals:    12/11/17 1945   BP: (!) 142/86   Pulse: 93   Resp: 18   Temp: 98.7 °F (37.1 °C)   SpO2: 97%     Patient's BP stabilizing to desired parameters. PO Procardia and Lopressor given as scheduled. Problem: Tissue Perfusion - Cardiopulmonary, Altered:  Goal: Absence of angina  Absence of angina   Outcome: Ongoing  Patient has denied the presence of any angina so far this shift. Will continue to monitor. Comments: Care plan reviewed with patient. Patient verbalize understanding of the plan of care and contribute to goal setting.

## 2017-12-12 NOTE — PROGRESS NOTES
Sublingual, Q5 Min PRN  metoprolol tartrate (LOPRESSOR) tablet 25 mg, 25 mg, Oral, BID  chlorthalidone (HYGROTON) tablet 25 mg, 25 mg, Oral, Daily  NIFEdipine (PROCARDIA XL) extended release tablet 30 mg, 30 mg, Oral, BID  sodium chloride flush 0.9 % injection 10 mL, 10 mL, Intravenous, 2 times per day  sodium chloride flush 0.9 % injection 10 mL, 10 mL, Intravenous, PRN  acetaminophen (TYLENOL) tablet 650 mg, 650 mg, Oral, Q4H PRN  magnesium hydroxide (MILK OF MAGNESIA) 400 MG/5ML suspension 30 mL, 30 mL, Oral, Daily PRN  ondansetron (ZOFRAN) injection 4 mg, 4 mg, Intravenous, Q6H PRN  enoxaparin (LOVENOX) injection 40 mg, 40 mg, Subcutaneous, Daily  labetalol (NORMODYNE;TRANDATE) injection 20 mg, 20 mg, Intravenous, Q4H PRN    ASSESSMENT AND PLAN  Patient Active Problem List:     Hypertensive urgency     Polycythemia due to fall in plasma volume     Facial asymmetry     Chest pain     Polycythemia     Essential hypertension  A 1 Polycythemia. Phlebotomy as outpatient. 2 HTN     3 Elevated CO.     4 Chest pain. Plan: Recheck CO. Chest pain as per cardiology.     Hasmukh Gutierrez  6:24 PM  12/12/2017

## 2017-12-12 NOTE — PROGRESS NOTES
[facility]   Nephrology progress note  12/12/2017, 6:07 PM      Pt Name:    Cara Boone  MRN:     [id number]     [place]:    1995  Admit Date:    12/7/2017  2:19 PM  Primary Care Physician:  Rahel Martinez MD   Room number  2Q-11/081-R    Chief Complaint: Nephrology following for HTN    Subjective:  Patient seen and examined  This is late entry  Gokul Gallego with RN [facility] earlier this afternoon  Pt had an episode of chest pain earlier today    Objective:  24HR INTAKE/OUTPUT:    Intake/Output Summary (Last 24 hours) at 12/12/17 1807  Last data filed at 12/12/17 1702   Gross per 24 hour   Intake             1215 ml   Output              400 ml   Net              815 ml     I/O last 3 completed shifts: In: 1017 [P.O.:1025; I.V.:10]  Out: 400 [Urine:400]  I/O this shift: In: 480 [P.O.:480]  Out: -   Admission weight: 200 lb (90.7 kg)  Wt Readings from Last 3 Encounters:   12/12/17 241 lb 6.5 oz (109.5 kg)     Body mass index is 32.74 kg/m². Physical examination  VITALS:     Vitals:    12/12/17 1702   BP: (!) 168/97   Pulse: 103   Resp: 18   Temp: 98.6 °F (37 °C)   SpO2: 95%     General Appearance: alert and cooperative with exam, appears comfortable, no distress  Mouth/Throat: Oral mucosa moist  Neck: No JVD  Lungs: Air entry B/L, no rales, no use of accessory muscles  Heart:  S1, S2 heard  GI: soft, non-tender, no guarding  Extremities: no LE edema      Lab Data  CBC:   Recent Labs      12/10/17   0438  12/11/17   0528   WBC  9.7  9.9   HGB  17.8  18.2*   HCT  52.4*  51.5   PLT  247  203     BMP:  Recent Labs      12/12/17   0527   NA  141   K  4.0   CL  103   CO2  24   BUN  11   CREATININE  0.9   GLUCOSE  113*   CALCIUM  9.8     Hepatic: No results for input(s): LABALBU, AST, ALT, ALB, BILITOT, ALKPHOS in the last 72 hours.       Meds:  Infusion:    Meds:    chlorthalidone  25 mg Oral Daily    metoprolol tartrate  12.5 mg Oral BID    NIFEdipine  30 mg Oral BID    sodium chloride flush  10 mL Intravenous 2 times per day    enoxaparin  40 mg Subcutaneous Daily     Meds prn: nitroGLYCERIN, sodium chloride flush, acetaminophen, magnesium hydroxide, ondansetron, labetalol       Impression and Plan:  1. HTN: BP readings reviewed  - will increase lopressor 25 mg po BID  - Dopplers of renal arteries negative, normal renal ultrasound  - renin and aldosterone levels okay  - urine tox negative  - will check Free T4 levels although TSH okay  - check plasma catecholamines, 24 hr urine catecholamines, plasma free metanephrines and urine metanephrine    2. Polycythemia: hematology following  3.  Chest pain: cardiology consulted per primary    D/W patient and RN    Vianca Montero MD  [facility]

## 2017-12-12 NOTE — PLAN OF CARE
Problem: Pain:  Goal: Control of acute pain  Control of acute pain   Outcome: Ongoing  0900 One to one call placed to Dr Moreno patient complaining of chest pain 6 out of 10 dizzy, diaphoretic, /90 , patient states feeling SOB spO2 94% waiting for response   0910 Labatolol pushed, Lovenox administered. EKG ordered. /79 . Waiting for response.   [id number] telephone response - SL nitro 0.4mg PRN q5 min x 3  /91  93%,   1030 - patient continues to complain of dizziness, headache and chest pain tylenol given, physician update. Will continue to monitor    Problem: Discharge Planning:  Goal: Participates in care planning  Participates in care planning   Outcome: Ongoing  Plan is for patient to discharge today for therapeutic phleb at outpatient oncology today. Updated physician on above events. Waiting for response. 1400 - Dr Moreno placed consult for Cardiology and Trop lab. Outpatient oncology notified of schedule change. Problem: Cardiac Output - Decreased:  Goal: Hemodynamic stability will improve  Hemodynamic stability will improve   Outcome: Ongoing  Vitals:    12/12/17 1045   BP: (!) 145/73   Pulse: 95   Resp: 22   Temp:    SpO2:        Comments: Care plan reviewed with patient. Patient verbalize understanding of the plan of care and contribute to goal setting.

## 2017-12-13 ENCOUNTER — APPOINTMENT (OUTPATIENT)
Dept: CT IMAGING | Age: 22
DRG: 816 | End: 2017-12-13
Payer: MEDICARE

## 2017-12-13 LAB
ANION GAP SERPL CALCULATED.3IONS-SCNC: 14 MEQ/L (ref 8–16)
BUN BLDV-MCNC: 12 MG/DL (ref 7–22)
CALCIUM SERPL-MCNC: 9.9 MG/DL (ref 8.5–10.5)
CHLORIDE BLD-SCNC: 98 MEQ/L (ref 98–111)
CO2: 30 MEQ/L (ref 23–33)
CREAT SERPL-MCNC: 1.1 MG/DL (ref 0.4–1.2)
EKG ATRIAL RATE: 99 BPM
EKG P AXIS: 43 DEGREES
EKG P-R INTERVAL: 164 MS
EKG Q-T INTERVAL: 346 MS
EKG QRS DURATION: 92 MS
EKG QTC CALCULATION (BAZETT): 444 MS
EKG R AXIS: 72 DEGREES
EKG T AXIS: 20 DEGREES
EKG VENTRICULAR RATE: 99 BPM
GFR SERPL CREATININE-BSD FRML MDRD: > 90 ML/MIN/1.73M2
GLUCOSE BLD-MCNC: 110 MG/DL (ref 70–108)
POTASSIUM SERPL-SCNC: 4 MEQ/L (ref 3.5–5.2)
SODIUM BLD-SCNC: 142 MEQ/L (ref 135–145)

## 2017-12-13 PROCEDURE — 99231 SBSQ HOSP IP/OBS SF/LOW 25: CPT | Performed by: NURSE PRACTITIONER

## 2017-12-13 PROCEDURE — 6360000002 HC RX W HCPCS: Performed by: INTERNAL MEDICINE

## 2017-12-13 PROCEDURE — 6370000000 HC RX 637 (ALT 250 FOR IP): Performed by: INTERNAL MEDICINE

## 2017-12-13 PROCEDURE — 2580000003 HC RX 258: Performed by: INTERNAL MEDICINE

## 2017-12-13 PROCEDURE — 6370000000 HC RX 637 (ALT 250 FOR IP): Performed by: RADIOLOGY

## 2017-12-13 PROCEDURE — 36415 COLL VENOUS BLD VENIPUNCTURE: CPT

## 2017-12-13 PROCEDURE — 6360000004 HC RX CONTRAST MEDICATION: Performed by: INTERNAL MEDICINE

## 2017-12-13 PROCEDURE — 2500000003 HC RX 250 WO HCPCS: Performed by: RADIOLOGY

## 2017-12-13 PROCEDURE — B32T1ZZ COMPUTERIZED TOMOGRAPHY (CT SCAN) OF LEFT PULMONARY ARTERY USING LOW OSMOLAR CONTRAST: ICD-10-PCS | Performed by: RADIOLOGY

## 2017-12-13 PROCEDURE — B32S1ZZ COMPUTERIZED TOMOGRAPHY (CT SCAN) OF RIGHT PULMONARY ARTERY USING LOW OSMOLAR CONTRAST: ICD-10-PCS | Performed by: RADIOLOGY

## 2017-12-13 PROCEDURE — 75574 CT ANGIO HRT W/3D IMAGE: CPT

## 2017-12-13 PROCEDURE — 99232 SBSQ HOSP IP/OBS MODERATE 35: CPT | Performed by: INTERNAL MEDICINE

## 2017-12-13 PROCEDURE — 1200000003 HC TELEMETRY R&B

## 2017-12-13 PROCEDURE — 2500000003 HC RX 250 WO HCPCS

## 2017-12-13 PROCEDURE — 80048 BASIC METABOLIC PNL TOTAL CA: CPT

## 2017-12-13 RX ORDER — NITROGLYCERIN 0.4 MG/1
0.4 TABLET SUBLINGUAL ONCE
Status: COMPLETED | OUTPATIENT
Start: 2017-12-13 | End: 2017-12-13

## 2017-12-13 RX ORDER — METOPROLOL TARTRATE 5 MG/5ML
5 INJECTION INTRAVENOUS EVERY 5 MIN PRN
Status: DISCONTINUED | OUTPATIENT
Start: 2017-12-13 | End: 2017-12-14 | Stop reason: HOSPADM

## 2017-12-13 RX ADMIN — Medication 10 ML: at 20:03

## 2017-12-13 RX ADMIN — NIFEDIPINE 30 MG: 30 TABLET, FILM COATED, EXTENDED RELEASE ORAL at 08:21

## 2017-12-13 RX ADMIN — METOPROLOL TARTRATE 5 MG: 5 INJECTION INTRAVENOUS at 10:55

## 2017-12-13 RX ADMIN — METOPROLOL TARTRATE 5 MG: 5 INJECTION INTRAVENOUS at 11:01

## 2017-12-13 RX ADMIN — Medication 10 ML: at 08:22

## 2017-12-13 RX ADMIN — NIFEDIPINE 30 MG: 30 TABLET, FILM COATED, EXTENDED RELEASE ORAL at 20:03

## 2017-12-13 RX ADMIN — CHLORTHALIDONE 25 MG: 25 TABLET ORAL at 08:21

## 2017-12-13 RX ADMIN — NITROGLYCERIN 0.4 MG: 0.4 TABLET SUBLINGUAL at 11:06

## 2017-12-13 RX ADMIN — IOPAMIDOL 80 ML: 755 INJECTION, SOLUTION INTRAVENOUS at 11:47

## 2017-12-13 RX ADMIN — ENOXAPARIN SODIUM 40 MG: 40 INJECTION SUBCUTANEOUS at 08:21

## 2017-12-13 RX ADMIN — METOPROLOL TARTRATE 50 MG: 50 TABLET, FILM COATED ORAL at 08:21

## 2017-12-13 RX ADMIN — ACETAMINOPHEN 650 MG: 325 TABLET ORAL at 20:03

## 2017-12-13 RX ADMIN — ACETAMINOPHEN 650 MG: 325 TABLET ORAL at 08:24

## 2017-12-13 RX ADMIN — METOPROLOL TARTRATE 5 MG: 5 INJECTION INTRAVENOUS at 10:49

## 2017-12-13 RX ADMIN — METOPROLOL TARTRATE 50 MG: 50 TABLET, FILM COATED ORAL at 20:03

## 2017-12-13 ASSESSMENT — PAIN SCALES - GENERAL
PAINLEVEL_OUTOF10: 3
PAINLEVEL_OUTOF10: 2
PAINLEVEL_OUTOF10: 3

## 2017-12-13 ASSESSMENT — PAIN DESCRIPTION - LOCATION: LOCATION: CHEST

## 2017-12-13 ASSESSMENT — PAIN DESCRIPTION - ORIENTATION: ORIENTATION: RIGHT;LEFT;MID

## 2017-12-13 ASSESSMENT — PAIN DESCRIPTION - PAIN TYPE: TYPE: ACUTE PAIN

## 2017-12-13 NOTE — PLAN OF CARE
Problem: Pain:  Goal: Pain level will decrease  Pain level will decrease    Outcome: Ongoing  Patient complaining of headache, tylenol given. Patient satisfied. Problem: Discharge Planning:  Goal: Participates in care planning  Participates in care planning   Outcome: Ongoing  Patient plans to be discharged home once medically stable. Problem: Cardiac Output - Decreased:  Goal: Hemodynamic stability will improve  Hemodynamic stability will improve   Outcome: Ongoing  /98  this morning during shift assessment. BP medications administered. Patient off unit for coronary CT for recheck. Will continue to monitor. Problem: Pain:  Goal: Pain level will decrease  Pain level will decrease    Outcome: Ongoing  Patient complaining of headache, tylenol given. Patient satisfied. Comments: Care plan reviewed with patient. Patient verbalize understanding of the plan of care and contribute to goal setting.

## 2017-12-13 NOTE — CONSULTS
the  case, also to evaluate for the cause of hypertension. PAST MEDICAL HISTORY:  Hypertension since 2004 and color blindness and  legally blind. PAST SURGICAL HISTORY:  None. FAMILY HISTORY:  Hypertension. SOCIAL HISTORY:  He has never smoked. No alcohol. No drug use. MEDICATIONS:  Prior to admission include the following; vitamin D, Inderal  20 mg two times a day and multivitamin. So, he is taking very low-dose  medication for blood pressure. ALLERGIES:  No known drug allergies. REVIEW OF SYSTEMS:  Currently, mild chest discomfort. Otherwise, no  nausea, vomiting, vomiting, or diarrhea. No dysuria, urgency, or  frequency. The rest of the review of systems is negative except the  above-mentioned ones. PHYSICAL EXAMINATION:  GENERAL:  Looks sick, in no form of distress. VITAL SIGNS:  Blood pressure 168/90, pulse rate 97, respiratory rate 18,  temperature is 98. 6. HEENT:  Pink conjunctivae. Anicteric sclerae. Pupils are equal and  reactive bilaterally to light and accommodation. Extraocular movements are  intact. NECK:  No lymphadenopathy. No goiter. No JVD. CHEST:  Clear to auscultation and percussion. CARDIOVASCULAR:  Arteries are felt; carotid, femoral, and pedal.  No  bruits. S1, S2 well heard. No gallop rhythm. ABDOMEN:  Soft, nontender, nondistended. Bowel sounds are positive. GENITOURINARY:  No CVA, flank or suprapubic tenderness. LOCOMOTOR:  No cyanosis, clubbing, muscle or joint tenderness. SKIN:  No rashes, ulcers, or nodules. CNS:  Alert, awake, oriented to time, person and place. Cranial nerves  intact. Sensation is intact to touch and pain. Motor, normal muscle  strength and tone. Appropriate mood and affect. On cranial nerves, this  patient is legally blind; otherwise other cranial nerves are intact. WORKUP:  Blood chemistry:  Sodium 141, potassium 4, BUN 11, creatinine 0.9. Hematology:  White blood cell 9.9, hemoglobin 18.2, and platelets 235.

## 2017-12-13 NOTE — PLAN OF CARE
Problem: Pain:  Goal: Control of acute pain  Control of acute pain   Outcome: Ongoing  Patient complained of headache rating 4/10. Tylenol given. No additional pain at this time. Problem: Discharge Planning:  Goal: Participates in care planning  Participates in care planning   Outcome: Ongoing  Patient able to assist in identifying goals to reach throughout the shift. Goal: Discharged to appropriate level of care  Discharged to appropriate level of care   Outcome: Ongoing  Patient plans to return home at discharge. Problem: Cardiac Output - Decreased:  Goal: Hemodynamic stability will improve  Hemodynamic stability will improve   Outcome: Ongoing  Vitals:    12/12/17 2015   BP: 122/79   Pulse: 86   Resp: 18   Temp: 98.3 °F (36.8 °C)   SpO2: 96%     Patient vitals within desired parameters. PO Metoprolol switched to 50 mg tonight. Problem: Tissue Perfusion - Cardiopulmonary, Altered:  Goal: Absence of angina  Absence of angina   Outcome: Ongoing  Patient has denied the presence of chest pain. Will continue to monitor. Comments: Care plan reviewed with patient. Patient verbalize understanding of the plan of care and contribute to goal setting.

## 2017-12-13 NOTE — PROGRESS NOTES
[facility]      Progress Note  STRZ Renal Telemetry 6K       Patient: Dameon Veliz  Unit/Bed: 5R-78/834-Q  YOB: 1995  MRN: [id number]  Acct: [de-identified]   Admitting Diagnosis: Hypertensive crisis [I16.9]  Admit Date:  12/7/2017  Hospital Day: 6    Interval History:    Patient presented with symptoms of chest pain and headache in the setting of accelerated hypertension and polycythemia. Diagnostic findings from work up that includes MRI, EKG, Renal US and ECHO have been non-revealing. He is on admission for diagnostic evaluation and BP control and currently being treated with IV Labetalol. The following Services are consulting: Nephrology     Chart, Labs, Radiology studies, and Consults reviewed. Subjective:  Patient seen and examined   He reports improvement of the chest pain today. Objective:   BP (!) 142/78   Pulse 86   Temp 97.8 °F (36.6 °C) (Oral)   Resp 20   Ht 6' (1.829 m)   Wt 241 lb 9.6 oz (109.6 kg)   SpO2 97%   BMI 32.77 kg/m²       Intake/Output Summary (Last 24 hours) at 12/13/17 1232  Last data filed at 12/13/17 0940   Gross per 24 hour   Intake              910 ml   Output                0 ml   Net              910 ml        General Appearance:  Well developed, obese young male, in no apparent distress. Skin:  Skin has good color, good turgor, no rashes, no acute lesions  Lungs: CTA bilaterally, no wheeze, rales or rhonchi, good air entry, good respiratory effort  Heart: Regular rate and rhythm, S1 and S2 only without murmur, gallop or rub. Abdomen:  Soft, non-tender, normal bowel sounds. No bruits, organomegaly or masses. Extremities: Extremities warm to touch, pink, with no edema. , brisk capillary refill. Neurologic: Awake, alert and oriented. No focal deficits  Affect: Neutral/Euthymic(normal)    Maxwell:  Drains:  Central Line/port:   EKG:  Imaging:    Diet:  Diet NPO Time Specified Exceptions are:  Other (See Comment)    DVT Prophylaxis:    Data:  CBC:   Recent Labs      12/11/17   0528   WBC  9.9   RBC  6.19*   HGB  18.2*   HCT  51.5   MCV  83.2   RDW  13.2   PLT  203     BMP:   Recent Labs      12/12/17   0527  12/13/17   0527   NA  141  142   K  4.0  4.0   CL  103  98   CO2  24  30   BUN  11  12   CREATININE  0.9  1.1     BNP: No results for input(s): BNP in the last 72 hours. PT/INR: No results for input(s): PROTIME, INR in the last 72 hours. APTT: No results for input(s): APTT in the last 72 hours. CARDIAC ENZYMES:   Recent Labs      12/12/17   1451   TROPONINT  < 0.010         Assessment/Plan:  Active Problems:    Hypertensive urgency    Polycythemia due to fall in plasma volume    Facial asymmetry    Chest pain    Polycythemia    Essential hypertension      Active Issues  - Hypertension  - elevated hematocrit  - recurrent chest pain    PLAN    1. Uncontrolled Hypertension  - Nephrology evaluation and recommendations acknowledged with thanks  - Renal imaging reports reviewed  - Aldosterone and Renin levels are within normal limits  - no evidence of secondary endocrine or vascular cause of hypertension  - control improved on Metorpolol 50 mg bid, Chlorthalidone 25 mg and Nifedipine 30 mg    2. Elevated Hematocrit  - Hematology evaluation and recommendations acknowledged with thanks  - scheduled for therapeutic phlebotomy; but procedure needs to be as OP  - CO level: 4.5 > 7.4    3. Chest pain  - Cardiology evaluation and recommendations acknowledged with thanks  - CTA coronaries ordered; jean-pierre follow report  -  ECHO reviewed; normal    4.  Continue supportive measures and other current management in the interim    Electronically signed by Alcon Del Angel MD on 12/13/2017 at 12:32 PM    Attending Physician

## 2017-12-13 NOTE — PROGRESS NOTES
[facility]  Hematology/ Oncology Progress Note     Pt Name: Governor Olea  MRN: [id number]  [id number]  YOB: 1995  Admit Date: 12/7/2017  2:19 PM  Date of evaluation: 12/13/2017  Primary Care Physician: Marian Diaz MD   6K-14/014-A       SUBJECTIVE: Feeling Ok. BP better controlled.     OBJECTIVE    Physical  VITALS:  BP (!) 150/81   Pulse 100   Temp 98.2 °F (36.8 °C) (Oral)   Resp 18   Ht 6' (1.829 m)   Wt 241 lb 9.6 oz (109.6 kg)   SpO2 96%   BMI 32.77 kg/m²   CONSTITUTIONAL:  awake  EYES:  ENT:  normocepalic, without obvious abnormality  NECK:  supple, symmetrical, trachea midline  HEMATOLOGIC/LYMPHATICS:  no cervical lymphadenopathy and no supraclavicular lymphadenopathy  BACK:  symmetric  LUNGS:  Clear to A and P.  CARDIOVASCULAR:  normal apical pulses  ABDOMEN:  No scars, normal bowel sounds, soft, non-distended, non-tender, no masses palpated, no hepatosplenomegally  NEUROLOGIC:  Non Focal.  Data  Labs:  General Labs:    CBC:   Lab Results   Component Value Date    WBC 9.9 12/11/2017    RBC 6.19 12/11/2017    HGB 18.2 12/11/2017    HCT 51.5 12/11/2017    MCV 83.2 12/11/2017    MCH 29.4 12/11/2017    MCHC 35.3 12/11/2017    RDW 13.2 12/11/2017     12/11/2017    MPV 8.7 12/11/2017     CBC with Differential:    Lab Results   Component Value Date    WBC 9.9 12/11/2017    RBC 6.19 12/11/2017    HGB 18.2 12/11/2017    HCT 51.5 12/11/2017     12/11/2017    MCV 83.2 12/11/2017    MCH 29.4 12/11/2017    MCHC 35.3 12/11/2017    RDW 13.2 12/11/2017    NRBC 0 12/10/2017    SEGSPCT 78.0 12/10/2017    MONOPCT 5.9 12/10/2017    MONOSABS 0.6 12/10/2017    LYMPHSABS 1.3 12/10/2017    EOSABS 0.2 12/10/2017    BASOSABS 0.0 12/10/2017     Current Medications  Current Facility-Administered Medications: metoprolol (LOPRESSOR) injection 5 mg, 5 mg, Intravenous, Q5 Min PRN  nitroGLYCERIN (NITROSTAT) SL tablet 0.4 mg, 0.4 mg, Sublingual, Q5 Min PRN  metoprolol tartrate (LOPRESSOR) tablet 50 mg, 50 mg, Oral, BID  chlorthalidone (HYGROTON) tablet 25 mg, 25 mg, Oral, Daily  NIFEdipine (PROCARDIA XL) extended release tablet 30 mg, 30 mg, Oral, BID  sodium chloride flush 0.9 % injection 10 mL, 10 mL, Intravenous, 2 times per day  sodium chloride flush 0.9 % injection 10 mL, 10 mL, Intravenous, PRN  acetaminophen (TYLENOL) tablet 650 mg, 650 mg, Oral, Q4H PRN  magnesium hydroxide (MILK OF MAGNESIA) 400 MG/5ML suspension 30 mL, 30 mL, Oral, Daily PRN  ondansetron (ZOFRAN) injection 4 mg, 4 mg, Intravenous, Q6H PRN  enoxaparin (LOVENOX) injection 40 mg, 40 mg, Subcutaneous, Daily  labetalol (NORMODYNE;TRANDATE) injection 20 mg, 20 mg, Intravenous, Q4H PRN    ASSESSMENT AND PLAN  Patient Active Problem List:     Hypertensive urgency     Polycythemia due to fall in plasma volume     Facial asymmetry     Chest pain     Polycythemia     Essential hypertension  1 Polycythemia. ANGEL 2 pending. Echocardiogram OK  2 HTN  Plan: 500 ml Phlebotomy after discharge tomorrow. F/U in 2 weeks with CBC.     KANA RDZ  5:00 PM  12/13/2017

## 2017-12-13 NOTE — CARE COORDINATION
12/13/17, 11:47 AM      León Dorman day: 6  Location: [facility]14/014 Reason for admit: Hypertensive crisis [I16.9]   Treatment Plan of Care: BP has improved. Cardiology consulted after episode of severe chest pain yesterday, CT coronaries ordered and pending. Anticipate discharge pending results. Core Measures: vte  PCP: Idalia Wild MD  Discharge Plan: Home alone with assistance from father.

## 2017-12-13 NOTE — PROGRESS NOTES
Nutrition Assessment    Type and Reason for Visit: Initial (length of stay review)    Malnutrition Assessment:  · Malnutrition Status: No malnutrition    Nutrition Diagnosis:   · Problem: No nutrition diagnosis at this time    Nutrition Assessment:  · Subjective Assessment: Patient admit with hypertenisive crisis. Patient seen- reports good appetite, states was told by doctor one year ago to begin following cardiac diet which he states he tries to follow for the most part, enjoys lean meats, fruit/ vegetables and use of only pinch of salt, has been trying different herbs to flavor foods. Nutrition Risk Level   Risk Level: Low    Nutrition Intervention  Food and/or Delivery: Continue current diet  Nutrition Education/Counseling/Coordination of Care:  Continued Inpatient Monitoring, Education Completed (reviewed cardiac diet guidelines with patient, written materials provided with RD contact information for reference)    Patient assessed for nutrition risk. Deemed to be at low risk at this time. Will continue to follow patient.       Electronically signed by Daniele Layne RD, LD on 12/13/17 at 12:04 PM    Contact Number: [phone]

## 2017-12-13 NOTE — PROGRESS NOTES
Cardiology Progress Note      Patient:  Rosalind Rios  YOB: 1995  MRN: [id number]   Acct: [de-identified]  Admit Date:  12/7/2017  Primary Cardiologist: none  Seen by Dr. Arlette Hinson:  Recurrent chest pain in a 58-year-old gentleman  admitted with chest pain and hypertension.     HISTORY OF PRESENT ILLNESS:  This is a 58-year-old gentleman who was in  usual state of health until five days ago. Basically last Thursday, the  patient started to have sudden onset of chest pain with shortness of  breath. The chest pain is left sided, pressure, heavy and 6/10 with  non-exertion, non-radiating and after several minutes subsided on its own  and having had this type of recurrent chest pain and so, finally the  patient decided and came to the emergency room. He was found to have a  markedly elevated blood pressure and tachycardic. Heart beats was 110,  blood pressure 189/112, and hemoglobin was elevated and EKG showed sinus  tachycardia without any ischemic change. So, the patient was admitted and  it has been tried to control his blood pressure. Has been pain free ever  since he was admitted on 12/07 here, but today, this morning he had another  episode of chest pain. The chest pain persisted for several hours and  resolved after he had been given nitroglycerin. Not resolved _____  nitroglycerin and then basically now he has a very minimal type of chest  pain. So, Cardiology evaluation was sought in view of the recurrent  episodes of chest pain that he had in the last five days. The patient's  blood pressure is poorly controlled. He has high hemoglobin, considered to  be polycythemia and Hematology on the case. Nephrology was also in the  case, also to evaluate for the cause of hypertension        Subjective (Events in last 24 hours):      So far LOUANN negative  Echo WNL    Awaiting coronary CTA    Pt ambulating in the hallway- does still c/o intermittent Lt sided chest pain described as a sharp pain without radiation or associated symptoms    BP improved with regimen  HR ST 90's    Objective:   BP (!) 142/78   Pulse 86   Temp 97.8 °F (36.6 °C) (Oral)   Resp 20   Ht 6' (1.829 m)   Wt 241 lb 9.6 oz (109.6 kg)   SpO2 97%   BMI 32.77 kg/m²        TELEMETRY: ST 90's    Physical Exam:  General Appearance: alert and oriented to person, place and time, in no acute distress  Cardiovascular: normal rate, regular rhythm, normal S1 and S2, no murmurs, rubs, clicks, or gallops, distal pulses intact  Pulmonary/Chest: clear to auscultation bilaterally- no wheezes, rales or rhonchi, normal air movement, no respiratory distress  Abdomen: soft, non-tender, non-distended, normal bowel sounds, no masses Extremities: no cyanosis, clubbing or edema, pulses present   Skin: warm and dry, no rash or erythema  Head: normocephalic and atraumatic   Musculoskeletal: normal range of motion, no joint swelling, deformity or tenderness  Neurological: alert, oriented, normal speech, no focal findings or movement disorder noted    Medications:    metoprolol tartrate  50 mg Oral BID    chlorthalidone  25 mg Oral Daily    NIFEdipine  30 mg Oral BID    sodium chloride flush  10 mL Intravenous 2 times per day    enoxaparin  40 mg Subcutaneous Daily          metoprolol 5 mg Q5 Min PRN   nitroGLYCERIN 0.4 mg Q5 Min PRN   sodium chloride flush 10 mL PRN   acetaminophen 650 mg Q4H PRN   magnesium hydroxide 30 mL Daily PRN   ondansetron 4 mg Q6H PRN   labetalol 20 mg Q4H PRN       Diagnostics:  EK-DEC-2017 09:31:05 [facility]-6K ROUTINE RETRIEVAL  Normal sinus rhythm  Normal ECG  When compared with ECG of 07-DEC-2017 14:23,  No significant change was found  Confirmed by [facility] [address] ([id number]) on 2017 4:45:33 AM    Echo:  Electronically signed by Rishi Bro MD (Interpreting   physician) on 2017 at 06:37

## 2017-12-13 NOTE — PROGRESS NOTES
[facility]   Nephrology progress note  12/13/2017, 3:40 PM      Pt Name:    Josselyn Bacon  MRN:     [id number]     [place]:    1995  Admit Date:    12/7/2017  2:19 PM  Primary Care Physician:  Kary Sexton MD   Room number  7E-19/919-T    Chief Complaint: Nephrology following for HTN    Subjective:  Patient seen and examined  No shortness of breath  No nausea or vomiting      Objective:  24HR INTAKE/OUTPUT:      Intake/Output Summary (Last 24 hours) at 12/13/17 1540  Last data filed at 12/13/17 0940   Gross per 24 hour   Intake              910 ml   Output                0 ml   Net              910 ml     I/O last 3 completed shifts: In: 910 [P.O.:900; I.V.:10]  Out: -   No intake/output data recorded. Admission weight: 200 lb (90.7 kg)  Wt Readings from Last 3 Encounters:   12/13/17 241 lb 9.6 oz (109.6 kg)     Body mass index is 32.77 kg/m². Physical examination  VITALS:   T 97.8, R 20, HR 86  Vitals:    12/13/17 1135   BP: (!) 142/78   Pulse:    Resp:    Temp:    SpO2:      General Appearance: alert and cooperative with exam, appears comfortable, no distress  Mouth/Throat: Oral mucosa moist  Neck: No JVD  Lungs: Air entry B/L, no rales, no use of accessory muscles  Heart:  S1, S2 heard  GI: soft, non-tender, no guarding  Extremities: no LE edema      Lab Data  CBC:   Recent Labs      12/11/17   0528   WBC  9.9   HGB  18.2*   HCT  51.5   PLT  203     BMP:  Recent Labs      12/12/17   0527  12/13/17   0527   NA  141  142   K  4.0  4.0   CL  103  98   CO2  24  30   BUN  11  12   CREATININE  0.9  1.1   GLUCOSE  113*  110*   CALCIUM  9.8  9.9     Hepatic: No results for input(s): LABALBU, AST, ALT, ALB, BILITOT, ALKPHOS in the last 72 hours.       Meds:  Infusion:    Meds:    metoprolol tartrate  50 mg Oral BID    chlorthalidone  25 mg Oral Daily    NIFEdipine  30 mg Oral BID    sodium chloride flush  10 mL Intravenous 2 times per day    enoxaparin  40 mg Subcutaneous Daily Meds prn: metoprolol, nitroGLYCERIN, sodium chloride flush, acetaminophen, magnesium hydroxide, ondansetron, labetalol       Impression and Plan:  1. HTN: BP readings reviewed and appears improved overall  - lopressor increased further to 50 mg po BID by cardiology  - Dopplers of renal arteries negative, normal renal ultrasound  - renin and aldosterone levels okay  - urine tox negative, Free T4 okay  - Follow  plasma catecholamines, 24 hr urine catecholamines, plasma free metanephrines and urine metanephrine-- can be followed in office by Dr. Viviana Monge    2. Polycythemia: hematology following  3.  Chest pain: cardiology following    D/W patient and RN    Courtney Lima MD  [facility]

## 2017-12-14 VITALS
DIASTOLIC BLOOD PRESSURE: 79 MMHG | WEIGHT: 239.4 LBS | SYSTOLIC BLOOD PRESSURE: 155 MMHG | HEART RATE: 98 BPM | BODY MASS INDEX: 32.43 KG/M2 | TEMPERATURE: 98 F | HEIGHT: 72 IN | RESPIRATION RATE: 18 BRPM | OXYGEN SATURATION: 99 %

## 2017-12-14 PROBLEM — I16.0 HYPERTENSIVE URGENCY: Status: RESOLVED | Noted: 2017-12-07 | Resolved: 2017-12-14

## 2017-12-14 PROBLEM — Q67.0 FACIAL ASYMMETRY: Status: RESOLVED | Noted: 2017-12-07 | Resolved: 2017-12-14

## 2017-12-14 LAB
ANION GAP SERPL CALCULATED.3IONS-SCNC: 17 MEQ/L (ref 8–16)
BUN BLDV-MCNC: 14 MG/DL (ref 7–22)
CALCIUM SERPL-MCNC: 10 MG/DL (ref 8.5–10.5)
CHLORIDE BLD-SCNC: 97 MEQ/L (ref 98–111)
CO2: 25 MEQ/L (ref 23–33)
CREAT SERPL-MCNC: 0.9 MG/DL (ref 0.4–1.2)
GFR SERPL CREATININE-BSD FRML MDRD: > 90 ML/MIN/1.73M2
GLUCOSE BLD-MCNC: 114 MG/DL (ref 70–108)
MISC. #1 REFERENCE GROUP TEST: NORMAL
POTASSIUM SERPL-SCNC: 3.7 MEQ/L (ref 3.5–5.2)
SODIUM BLD-SCNC: 139 MEQ/L (ref 135–145)

## 2017-12-14 PROCEDURE — 36415 COLL VENOUS BLD VENIPUNCTURE: CPT

## 2017-12-14 PROCEDURE — 99231 SBSQ HOSP IP/OBS SF/LOW 25: CPT | Performed by: INTERNAL MEDICINE

## 2017-12-14 PROCEDURE — 99231 SBSQ HOSP IP/OBS SF/LOW 25: CPT | Performed by: NURSE PRACTITIONER

## 2017-12-14 PROCEDURE — 6370000000 HC RX 637 (ALT 250 FOR IP): Performed by: INTERNAL MEDICINE

## 2017-12-14 PROCEDURE — 2580000003 HC RX 258: Performed by: INTERNAL MEDICINE

## 2017-12-14 PROCEDURE — 6360000002 HC RX W HCPCS: Performed by: INTERNAL MEDICINE

## 2017-12-14 PROCEDURE — 80048 BASIC METABOLIC PNL TOTAL CA: CPT

## 2017-12-14 RX ORDER — NIFEDIPINE 30 MG/1
30 TABLET, FILM COATED, EXTENDED RELEASE ORAL 2 TIMES DAILY
Qty: 30 TABLET | Refills: 3 | Status: SHIPPED | OUTPATIENT
Start: 2017-12-14

## 2017-12-14 RX ORDER — CHLORTHALIDONE 25 MG/1
25 TABLET ORAL DAILY
Qty: 30 TABLET | Refills: 3 | Status: SHIPPED | OUTPATIENT
Start: 2017-12-15

## 2017-12-14 RX ORDER — METOPROLOL TARTRATE 50 MG/1
50 TABLET, FILM COATED ORAL 2 TIMES DAILY
Qty: 60 TABLET | Refills: 3 | Status: SHIPPED | OUTPATIENT
Start: 2017-12-14

## 2017-12-14 RX ADMIN — METOPROLOL TARTRATE 50 MG: 50 TABLET, FILM COATED ORAL at 07:54

## 2017-12-14 RX ADMIN — CHLORTHALIDONE 25 MG: 25 TABLET ORAL at 07:54

## 2017-12-14 RX ADMIN — NIFEDIPINE 30 MG: 30 TABLET, FILM COATED, EXTENDED RELEASE ORAL at 07:54

## 2017-12-14 RX ADMIN — Medication 10 ML: at 07:56

## 2017-12-14 RX ADMIN — ENOXAPARIN SODIUM 40 MG: 40 INJECTION SUBCUTANEOUS at 07:56

## 2017-12-14 RX ADMIN — ACETAMINOPHEN 650 MG: 325 TABLET ORAL at 13:25

## 2017-12-14 ASSESSMENT — PAIN SCALES - GENERAL
PAINLEVEL_OUTOF10: 0
PAINLEVEL_OUTOF10: 0
PAINLEVEL_OUTOF10: 4

## 2017-12-14 NOTE — PROGRESS NOTES
described as a sharp pain without radiation or associated symptoms    BP improved with regimen  HR ST 90's      2017   Cardiac CTA with calcium score of 0    Pt ready to go home       Objective:   /70   Pulse 107   Temp 98 °F (36.7 °C) (Oral)   Resp 20   Ht 6' (1.829 m)   Wt 239 lb 6.4 oz (108.6 kg)   SpO2 97%   BMI 32.47 kg/m²        TELEMETRY: ST 90's    Physical Exam:  General Appearance: alert and oriented to person, place and time, in no acute distress  Cardiovascular: normal rate, regular rhythm, normal S1 and S2, no murmurs, rubs, clicks, or gallops, distal pulses intact  Pulmonary/Chest: clear to auscultation bilaterally- no wheezes, rales or rhonchi, normal air movement, no respiratory distress  Abdomen: soft, non-tender, non-distended, normal bowel sounds, no masses Extremities: no cyanosis, clubbing or edema, pulses present   Skin: warm and dry, no rash or erythema  Head: normocephalic and atraumatic   Musculoskeletal: normal range of motion, no joint swelling, deformity or tenderness  Neurological: alert, oriented, normal speech, no focal findings or movement disorder noted    Medications:    metoprolol tartrate  50 mg Oral BID    chlorthalidone  25 mg Oral Daily    NIFEdipine  30 mg Oral BID    sodium chloride flush  10 mL Intravenous 2 times per day    enoxaparin  40 mg Subcutaneous Daily          metoprolol 5 mg Q5 Min PRN   nitroGLYCERIN 0.4 mg Q5 Min PRN   sodium chloride flush 10 mL PRN   acetaminophen 650 mg Q4H PRN   magnesium hydroxide 30 mL Daily PRN   ondansetron 4 mg Q6H PRN   labetalol 20 mg Q4H PRN       Diagnostics:  EK-DEC-2017 09:31:05 [facility]-6K ROUTINE RETRIEVAL  Normal sinus rhythm  Normal ECG  When compared with ECG of 07-DEC-2017 14:23,  No significant change was found  Confirmed by [facility] [address] ([id number]) on 2017 4:45:33 AM    Echo:  Electronically signed by Vick Fowler MD (Interpreting   physician) on 2017 at 06:37 PM   ----------------------------------------------------------------      Findings      Mitral Valve   The mitral valve structure was normal with normal leaflet separation.   DOPPLER: The transmitral velocity was within the normal range with no   evidence for mitral stenosis. There was no evidence of mitral   regurgitation.      Aortic Valve   The aortic valve was trileaflet with normal thickness and cuspal   separation. DOPPLER: Transaortic velocity was within the normal range with   no evidence of aortic stenosis. There was no evidence of aortic   regurgitation.      Tricuspid Valve   The tricuspid valve structure was normal with normal leaflet separation.   DOPPLER: There was no evidence of tricuspid stenosis.   Trivial tricuspid regurgitation visualized.      Pulmonic Valve   The pulmonic valve leaflets exhibited normal thickness, no calcification,   and normal cuspal separation. DOPPLER: The transpulmonic velocity was   within the normal range with no evidence for regurgitation.      Left Atrium   Left atrial size was normal.      Left Ventricle   Normal left ventricle size and systolic function. Ejection fraction was   estimated at 65 %. There were no regional left ventricular wall motion   abnormalities and wall thickness was within normal limits.      Right Atrium   Right atrial size was normal.      Right Ventricle   The right ventricular size was normal with normal systolic function and   wall thickness.      Pericardial Effusion   The pericardium was normal in appearance with no evidence of a pericardial   effusion.      Pleural Effusion   No evidence of pleural effusion.      Aorta / Great Vessels   -Aortic root dimension within normal limits.   -The Pulmonary artery is within normal limits.   -IVC size is within normal limits with normal respiratory phasic changes. Lab Data:    Cardiac Enzymes:  No results for input(s): CKTOTAL, CKMB, CKMBINDEX, TROPONINI in the last 72 hours.     CBC:   Lab Results   Component Value Date    WBC 9.9 12/11/2017    RBC 6.19 12/11/2017    HGB 18.2 12/11/2017    HCT 51.5 12/11/2017     12/11/2017       CMP:    Lab Results   Component Value Date     12/14/2017    K 3.7 12/14/2017    CL 97 12/14/2017    CO2 25 12/14/2017    BUN 14 12/14/2017    CREATININE 0.9 12/14/2017    LABGLOM >90 12/14/2017    GLUCOSE 114 12/14/2017    CALCIUM 10.0 12/14/2017       Hepatic Function Panel:    Lab Results   Component Value Date    ALKPHOS 62 12/07/2017    ALT 37 12/07/2017    AST 30 12/07/2017    PROT 7.8 12/07/2017    BILITOT 0.7 12/07/2017    BILIDIR <0.2 12/07/2017    LABALBU 5.0 12/07/2017       Magnesium:  No results found for: MG    PT/INR:  No results found for: PROTIME, INR    HgBA1c:  No results found for: LABA1C    FLP:  No results found for: TRIG, HDL, LDLCALC, LDLDIRECT, LABVLDL    TSH:    Lab Results   Component Value Date    TSH 2.540 12/07/2017         Assessment:    HTN - uncontrolled-- much improved   Negative LOUANN    Recurrent chest pains   Trop negative   EKG no ischemic changes   Awaiting Coronary CT    Polycythemia      Plan:    coronary CTA pending   - if normal - look for noncardiac chest pain issue    HTN - managed by renal    Pt to follow with renal for HTN  And PCP for noncardiac chest pains         Electronically signed by Juan Manuel Guajardo CNP on 12/14/2017 at 9:48 AM

## 2017-12-14 NOTE — PROGRESS NOTES
 sodium chloride flush  10 mL Intravenous 2 times per day    enoxaparin  40 mg Subcutaneous Daily     Meds prn: metoprolol, nitroGLYCERIN, sodium chloride flush, acetaminophen, magnesium hydroxide, ondansetron, labetalol       Impression and Plan:  1. HTN: BP readings reviewed and appears improved overall  - Dopplers of renal arteries negative, normal renal ultrasound  - renin and aldosterone levels okay, urine tox negative, Free T4 okay  - Follow  plasma catecholamines, 24 hr urine catecholamines, plasma free metanephrines and urine metanephrine-- can be followed in office by Dr. Wm Jc  - Please schedule follow-up with Dr. Wm Jc in 1-2 weeks for HTN follow-up  - Monitor K on thiazide, BMP in one week and patient will follow with Dr. Wm Jc    2. Polycythemia: hematology following  3.  Chest pain: cardiology following    D/W patient and RN    Delmy King MD  [facility]

## 2017-12-14 NOTE — DISCHARGE SUMMARY
Renal Complete    Result Date: 12/8/2017  PROCEDURE: US RENAL COMPLETE CLINICAL INFORMATION: Hypertension . COMPARISON: No prior study. TECHNIQUE: 2-D grayscale ultrasound. Spectral Doppler analysis and color-flow Doppler analysis. FINDINGS: RIGHT KIDNEY - 11.5 x 5.3 x 4.9 cm Resistive Index - 0.56 Cortical Thickness - 0.9 cm LEFT KIDNEY - 10.6 x 6.5 x 5.1 cm Resistive Index - 0.48 Cortical Thickness - 1.3 cm URINARY BLADDER Pre-Void - 110 mL Post-Void - n/a mL Right kidney: The size and shape the right kidney is within normal limits. The cortical echotexture and thickness is normal. There is no hydronephrosis, cyst, mass or stone. There is normal blood flow. The resistive index is normal. Left kidney: The left kidney is of normal size and shape. The cortical echotexture and thickness is normal there is no hydronephrosis, cyst, mass, or stone. There is normal blood flow. The resistive index is normal. The urinary bladder appears normal. There are normal bilateral ureteral jets. 1.  Normal renal ultrasound. **This report has been created using voice recognition software. It may contain minor errors which are inherent in voice recognition technology. ** Final report electronically signed by Dr. Anthony Paulino on 12/8/2017 1:51 AM    Us Dup Abd Pel Retro Scrot Comp    Result Date: 12/8/2017  PROCEDURE: US DUP ABD PEL RETRO SCROT COMP CLINICAL INFORMATION: HYPERTENSION, uncontrolled blood pressure COMPARISON: No prior study. TECHNIQUE:  Grayscale, color flow and spectral waveform ultrasound images were obtained through the bilateral kidneys and bilateral renal arteries. Resistive indices and renal aortic ratios were measured. FINDINGS: The kidneys appear normal in size and echogenicity. No renal masses are identified. No hydronephrosis is seen. The resistive indices appear within normal limits within the kidneys bilaterally.  The renal aortic ratios within the right kidney measure 2.6, 0.7, and 1.1 within the proximal, brain. The dural venous sinuses and the deep cerebral veins, vein of [place], straight sinus, and the superior and inferior sagittal sinuses appear patent and are filled with contrast on this study. No obvious vascular abnormality. The major intracranial vascular flow voids are present. The midline craniocervical junction structures are normal.  The brainstem and pituitary gland are normal.     1.   No acute infarction, hemorrhage, or mass. 2.  No evidence of venous thrombosis or abnormal vascular structure on these postcontrast images. **This report has been created using voice recognition software. It may contain minor errors which are inherent in voice recognition technology. ** Final report electronically signed by Dr. Montana Kay on 12/7/2017 11:33 PM       Results for orders placed or performed during the hospital encounter of 12/07/17   D-dimer, quantitative   Result Value Ref Range    D-Dimer, Quant < 215.00 0.00 - 500.0 ng/ml FEU   CBC auto differential   Result Value Ref Range    WBC 8.8 4.8 - 10.8 thou/mm3    RBC 6.63 (H) 4.70 - 6.10 mill/mm3    Hemoglobin 18.7 (H) 14.0 - 18.0 gm/dl    Hematocrit 54.3 (H) 42.0 - 52.0 %    MCV 81.9 80.0 - 94.0 fL    MCH 28.1 27.0 - 31.0 pg    MCHC 34.3 33.0 - 37.0 gm/dl    RDW 13.5 11.5 - 14.5 %    Platelets 854 075 - 822 thou/mm3    MPV 8.6 7.4 - 10.4 mcm    Seg Neutrophils 74.3 %    Lymphocytes 16.7 %    Monocytes 4.5 %    Eosinophils 4.1 %    Basophils 0.4 %    nRBC 0 /100 wbc    Segs Absolute 6.5 1.8 - 7.7 thou/mm3    Lymphocytes # 1.5 1.0 - 4.8 thou/mm3    Monocytes # 0.4 0.4 - 1.3 thou/mm3    Eosinophils # 0.4 0.0 - 0.4 thou/mm3    Basophils # 0.0 0.0 - 0.1 thou/mm3   Basic Metabolic Panel   Result Value Ref Range    Sodium 143 135 - 145 meq/L    Potassium 3.7 3.5 - 5.2 meq/L    Chloride 102 98 - 111 meq/L    CO2 25 23 - 33 meq/L    Glucose 134 (H) 70 - 108 mg/dL    BUN 19 7 - 22 mg/dL    CREATININE 1.0 0.4 - 1.2 mg/dL    Calcium 9.7 8.5 - 10.5 mg/dL   Hepatic function panel   Result Value Ref Range    Alb 5.0 3.5 - 5.1 g/dL    Total Bilirubin 0.7 0.3 - 1.2 mg/dL    Bilirubin, Direct <0.2 0.0 - 0.3 mg/dL    Alkaline Phosphatase 62 38 - 126 U/L    AST 30 5 - 40 U/L    ALT 37 11 - 66 U/L    Total Protein 7.8 6.1 - 8.0 g/dL   Lipase   Result Value Ref Range    Lipase 21.8 5.6 - 51.3 U/L   Troponin   Result Value Ref Range    Troponin T < 0.010 ng/ml   TSH without Reflex   Result Value Ref Range    TSH 2.540 0.400 - 4.20 uIU/mL   Anion Gap   Result Value Ref Range    Anion Gap 16.0 8.0 - 16.0 meq/L   Glomerular Filtration Rate, Estimated   Result Value Ref Range    Est, Glom Filt Rate >90 ml/min/1.73m2   Osmolality   Result Value Ref Range    Osmolality Calc 289.2 275.0 - 300 mOsmol/kg   Urinalysis with microscopic   Result Value Ref Range    Glucose, Urine NEGATIVE NEGATIVE mg/dl    Bilirubin Urine NEGATIVE NEGATIVE    Ketones, Urine TRACE (A) NEGATIVE    Specific Gravity, UA >1.030 (A) 1.002 - 1.03    Blood, Urine NEGATIVE NEGATIVE    pH, UA 7.0 5.0 - 9.0    Protein, UA 30 (A) NEGATIVE mg/dl    Urobilinogen, Urine 1.0 0.0 - 1.0 eu/dl    Nitrite, Urine NEGATIVE NEGATIVE    Leukocyte Esterase, Urine NEGATIVE NEGATIVE    Color, UA YELLOW YELLOW-STR    Character, Urine CLEAR CLR-SL.[email or web address]    RBC, UA 3-5 0-2/hpf /hpf    WBC, UA NONE SEEN 0-4/hpf /hpf    Epi Cells 0-2 3-5/hpf /hpf    Bacteria, UA NONE FEW/NONE S    Casts NONE SEEN NONE SEEN /lpf    Crystals NONE SEEN NONE SEEN    Renal Epithelial, Urine NONE SEEN NONE SEEN    Yeast, UA NONE SEEN NONE SEEN    Casts NONE SEEN /lpf    Miscellaneous Lab Test Result NONE SEEN    Blood Gas, Arterial   Result Value Ref Range    pH, Blood Gas 7.44 7.35 - 7.45    PCO2 37 35 - 45 mmhg    PO2 74 71 - 104 mmhg    HCO3 25 23 - 28 mmol/l    Base Excess (Calculated) 1.4 -2.5 - 2.5 mmol/l    O2 Sat 95 %    DEVICE Room Air     Shadi Test Positive     Source: R Radial     COLLECTED BY: [id number]    Carboxyhemoglobin   Result Value Ref Range    Carbon Monoxide, Blood 4.5 % CO SAT   Basic metabolic panel   Result Value Ref Range    Sodium 143 135 - 145 meq/L    Potassium 4.0 3.5 - 5.2 meq/L    Chloride 100 98 - 111 meq/L    CO2 24 23 - 33 meq/L    Glucose 114 (H) 70 - 108 mg/dL    BUN 18 7 - 22 mg/dL    CREATININE 0.8 0.4 - 1.2 mg/dL    Calcium 9.4 8.5 - 10.5 mg/dL   CBC auto differential   Result Value Ref Range    WBC 9.4 4.8 - 10.8 thou/mm3    RBC 6.29 (H) 4.70 - 6.10 mill/mm3    Hemoglobin 18.0 14.0 - 18.0 gm/dl    Hematocrit 52.3 (H) 42.0 - 52.0 %    MCV 83.1 80.0 - 94.0 fL    MCH 28.6 27.0 - 31.0 pg    MCHC 34.4 33.0 - 37.0 gm/dl    RDW 13.1 11.5 - 14.5 %    Platelets 039 323 - 938 thou/mm3    MPV 8.5 7.4 - 10.4 mcm    Seg Neutrophils 66.5 %    Lymphocytes 22.4 %    Monocytes 7.0 %    Eosinophils 3.7 %    Basophils 0.4 %    nRBC 0 /100 wbc    Segs Absolute 6.3 1.8 - 7.7 thou/mm3    Lymphocytes # 2.1 1.0 - 4.8 thou/mm3    Monocytes # 0.7 0.4 - 1.3 thou/mm3    Eosinophils # 0.3 0.0 - 0.4 thou/mm3    Basophils # 0.0 0.0 - 0.1 thou/mm3   Anion Gap   Result Value Ref Range    Anion Gap 19.0 (H) 8.0 - 16.0 meq/L   Glomerular Filtration Rate, Estimated   Result Value Ref Range    Est, Glom Filt Rate >90 ml/min/1.73m2   Reticulocytes   Result Value Ref Range    Retic Ct Abs 1.6 0.5 - 2.0 %   Renin   Result Value Ref Range    Renin Activity 1.6 ng/mL/hr   Aldosterone   Result Value Ref Range    Aldosterone 10.5 ng/dL   CBC Auto Differential   Result Value Ref Range    WBC 10.4 4.8 - 10.8 thou/mm3    RBC 6.57 (H) 4.70 - 6.10 mill/mm3    Hemoglobin 19.1 (H) 14.0 - 18.0 gm/dl    Hematocrit 54.7 (H) 42.0 - 52.0 %    MCV 83.3 80.0 - 94.0 fL    MCH 29.1 27.0 - 31.0 pg    MCHC 34.9 33.0 - 37.0 gm/dl    RDW 12.9 11.5 - 14.5 %    Platelets 559 770 - 278 thou/mm3    MPV 8.5 7.4 - 10.4 mcm    Seg Neutrophils 66.7 %    Lymphocytes 21.5 %    Monocytes 8.0 %    Eosinophils 3.2 %    Basophils 0.6 %    nRBC 0 /100 wbc    Segs Absolute 6.9 1.8 - 7.7 thou/mm3    Lymphocytes # 2.2 1.0 Negative NEGATIVE    PCP Quant, Ur Negative NEGATIVE   Microscopic Urinalysis   Result Value Ref Range    Glucose, Urine NEGATIVE NEGATIVE mg/dl    Bilirubin Urine NEGATIVE NEGATIVE    Ketones, Urine NEGATIVE NEGATIVE    Specific Gravity, UA 1.024 1.002 - 1.03    Blood, Urine NEGATIVE NEGATIVE    pH, UA 6.0 5.0 - 9.0    Protein, UA TRACE (A) NEGATIVE mg/dl    Urobilinogen, Urine 0.2 0.0 - 1.0 eu/dl    Nitrite, Urine NEGATIVE NEGATIVE    Leukocytes, UA NEGATIVE NEGATIVE    Color, UA YELLOW YELLOW-STR    Character, Urine CLEAR CLR-SL.MIKEY    RBC, UA 0-2 0-2/hpf /hpf    WBC, UA NONE SEEN 0-4/hpf /hpf    Epi Cells NONE SEEN 3-5/hpf /hpf    Bacteria, UA NONE FEW/NONE S    Casts NONE SEEN NONE SEEN /lpf    Crystals NONE SEEN NONE SEEN    Renal Epithelial, Urine NONE SEEN NONE SEEN    Yeast, UA NONE SEEN NONE SEEN    Casts NONE SEEN /lpf    Miscellaneous Lab Test Result NONE SEEN    Basic Metabolic Panel   Result Value Ref Range    Sodium 141 135 - 145 meq/L    Potassium 4.0 3.5 - 5.2 meq/L    Chloride 103 98 - 111 meq/L    CO2 24 23 - 33 meq/L    Glucose 113 (H) 70 - 108 mg/dL    BUN 11 7 - 22 mg/dL    CREATININE 0.9 0.4 - 1.2 mg/dL    Calcium 9.8 8.5 - 10.5 mg/dL   Anion Gap   Result Value Ref Range    Anion Gap 14.0 8.0 - 16.0 meq/L   Glomerular Filtration Rate, Estimated   Result Value Ref Range    Est, Glom Filt Rate >90 ml/min/1.73m2   Troponin   Result Value Ref Range    Troponin T < 0.010 ng/ml   Basic Metabolic Panel   Result Value Ref Range    Sodium 142 135 - 145 meq/L    Potassium 4.0 3.5 - 5.2 meq/L    Chloride 98 98 - 111 meq/L    CO2 30 23 - 33 meq/L    Glucose 110 (H) 70 - 108 mg/dL    BUN 12 7 - 22 mg/dL    CREATININE 1.1 0.4 - 1.2 mg/dL    Calcium 9.9 8.5 - 10.5 mg/dL   T4, Free   Result Value Ref Range    T4 Free 1.58 0.93 - 1.76 ng/dL   Carboxyhemoglobin   Result Value Ref Range    Carbon Monoxide, Blood 5.1 % CO SAT   Anion Gap   Result Value Ref Range    Anion Gap 14.0 8.0 - 16.0 meq/L   Glomerular Filtration Rate, Estimated   Result Value Ref Range    Est, Glom Filt Rate >90 DX/HHK/3.00T9   Basic Metabolic Panel   Result Value Ref Range    Sodium 139 135 - 145 meq/L    Potassium 3.7 3.5 - 5.2 meq/L    Chloride 97 (L) 98 - 111 meq/L    CO2 25 23 - 33 meq/L    Glucose 114 (H) 70 - 108 mg/dL    BUN 14 7 - 22 mg/dL    CREATININE 0.9 0.4 - 1.2 mg/dL    Calcium 10.0 8.5 - 10.5 mg/dL   Anion Gap   Result Value Ref Range    Anion Gap 17.0 (H) 8.0 - 16.0 meq/L   Glomerular Filtration Rate, Estimated   Result Value Ref Range    Est, Glom Filt Rate >90 ml/min/1.73m2   EKG Chest Pain   Result Value Ref Range    Ventricular Rate 107 BPM    Atrial Rate 107 BPM    P-R Interval 154 ms    QRS Duration 88 ms    Q-T Interval 324 ms    QTc Calculation (Bazett) 432 ms    P Axis 48 degrees    R Axis 86 degrees    T Axis 34 degrees   EKG 12 Lead   Result Value Ref Range    Ventricular Rate 99 BPM    Atrial Rate 99 BPM    P-R Interval 164 ms    QRS Duration 92 ms    Q-T Interval 346 ms    QTc Calculation (Bazett) 444 ms    P Axis 43 degrees    R Axis 72 degrees    T Axis 20 degrees       Diet:  DIET LOW SODIUM 2 GM;     Activity:  Activity as tolerated (Patient may move about with assist as indicated or with supervision.)    Follow-up:  in the next few days with David Cerna MD,  in the next few weeks with Hematology and Nephrology    Disposition: home    Condition: Stable      Time Spent: 35 minutes    Electronically signed by Joe Cedeño MD on 12/14/2017 at 11:24 AM    Attending Physician

## 2017-12-14 NOTE — PROGRESS NOTES
RN placed call to Outpatient oncology to schedule therapeutic phlebotomy. Waiting for a call back to schedule prior to discharge.

## 2017-12-14 NOTE — PROGRESS NOTES
Discharge teaching and instructions for diagnosis/procedure of hypertension completed with patient using teachback method. AVS reviewed. Printed prescriptions given to patient. Patient voiced understanding regarding prescriptions, follow up appointments, and care of self at home.  Discharged in a wheelchair to  home with support per family

## 2017-12-15 ENCOUNTER — HOSPITAL ENCOUNTER (OUTPATIENT)
Dept: INFUSION THERAPY | Age: 22
Discharge: HOME | End: 2017-12-15
Payer: MEDICARE

## 2017-12-15 VITALS
WEIGHT: 239.4 LBS | OXYGEN SATURATION: 99 % | SYSTOLIC BLOOD PRESSURE: 152 MMHG | RESPIRATION RATE: 18 BRPM | HEART RATE: 100 BPM | HEIGHT: 72 IN | DIASTOLIC BLOOD PRESSURE: 85 MMHG | BODY MASS INDEX: 32.43 KG/M2 | TEMPERATURE: 98 F

## 2017-12-15 DIAGNOSIS — D75.1 POLYCYTHEMIA: ICD-10-CM

## 2017-12-15 DIAGNOSIS — D45 POLYCYTHEMIA VERA (HCC): ICD-10-CM

## 2017-12-15 DIAGNOSIS — D75.1 POLYCYTHEMIA DUE TO FALL IN PLASMA VOLUME: ICD-10-CM

## 2017-12-15 DIAGNOSIS — I10 ESSENTIAL HYPERTENSION: ICD-10-CM

## 2017-12-15 LAB
HCT VFR BLD CALC: 54 % (ref 42–52)
HEMOGLOBIN: 18.4 GM/DL (ref 14–18)
HEMOGLOBIN: 18.4 GM/DL (ref 14–18)
METANEPHRINES PLASMA: NORMAL

## 2017-12-15 PROCEDURE — 99195 PHLEBOTOMY: CPT

## 2017-12-15 RX ORDER — 0.9 % SODIUM CHLORIDE 0.9 %
250 INTRAVENOUS SOLUTION INTRAVENOUS ONCE
Status: CANCELLED | OUTPATIENT
Start: 2017-12-15 | End: 2017-12-15

## 2017-12-15 RX ORDER — 0.9 % SODIUM CHLORIDE 0.9 %
500 INTRAVENOUS SOLUTION INTRAVENOUS ONCE
Status: CANCELLED | OUTPATIENT
Start: 2017-12-15 | End: 2017-12-15

## 2017-12-15 ASSESSMENT — PAIN SCALES - GENERAL
PAINLEVEL_OUTOF10: 0

## 2017-12-15 NOTE — PLAN OF CARE
Problem: Musculor/Skeletal Functional Status  Intervention: Fall precautions  Aware of fall precautions while here, call light within reach. Goal: Absence of falls  Outcome: Met This Shift  Free from fall after procedure    Problem: Intellectual/Education/Knowledge Deficit  Intervention: Verbal/written education provided  Patient instructed on phlebotomy procedure to help with elevated Hemoglobin    Goal: Teaching initiated upon admission  Outcome: Met This Shift  Patient verbalizes understanding of need for phlebotomy and procedure to remove blood    Problem: Discharge Planning  Intervention: Interaction with patient/family and care team  Patient verbalizes understanding of discharge instructions, follow up appointment, and when to call physician if needed    Goal: Knowledge of discharge instructions  Knowledge of discharge instructions    Outcome: Met This Shift      Comments: Care plan reviewed with patient. Patient verbalizes understanding of the plan of care and contributes to goal setting.

## 2017-12-16 LAB
CATECHOLAMINES FRACT FREE PLASMA: NORMAL
METANEPHRINES TOTAL URINE: NORMAL

## 2017-12-20 LAB — CATECHOLAMINES TOTAL 24 HOUR URINE: NORMAL

## 2018-11-20 NOTE — ED NOTES
Patient transported to Radiology via cart in stable condition.       [place] CHACHA Yo  12/07/17 9004 Detail Level: Zone Summarized Lab Results: DEBBIE positive